# Patient Record
Sex: MALE | Race: WHITE | NOT HISPANIC OR LATINO | URBAN - METROPOLITAN AREA
[De-identification: names, ages, dates, MRNs, and addresses within clinical notes are randomized per-mention and may not be internally consistent; named-entity substitution may affect disease eponyms.]

---

## 2018-04-08 PROBLEM — K64.4 EXTERNAL HEMORRHOIDS: Status: ACTIVE | Noted: 2018-04-08

## 2018-04-09 ENCOUNTER — OFFICE VISIT (OUTPATIENT)
Dept: FAMILY MEDICINE | Facility: CLINIC | Age: 23
End: 2018-04-09
Payer: COMMERCIAL

## 2018-04-09 VITALS
SYSTOLIC BLOOD PRESSURE: 116 MMHG | WEIGHT: 145.7 LBS | BODY MASS INDEX: 22.87 KG/M2 | DIASTOLIC BLOOD PRESSURE: 70 MMHG | OXYGEN SATURATION: 99 % | HEART RATE: 70 BPM | HEIGHT: 67 IN | TEMPERATURE: 98.2 F

## 2018-04-09 DIAGNOSIS — Z00.01 ENCOUNTER FOR ROUTINE ADULT HEALTH EXAMINATION WITH ABNORMAL FINDINGS: Primary | ICD-10-CM

## 2018-04-09 DIAGNOSIS — Z11.3 SCREEN FOR STD (SEXUALLY TRANSMITTED DISEASE): ICD-10-CM

## 2018-04-09 DIAGNOSIS — L72.0 EPITHELIAL CYST: ICD-10-CM

## 2018-04-09 DIAGNOSIS — K64.4 EXTERNAL HEMORRHOIDS: ICD-10-CM

## 2018-04-09 LAB — HIV 1+2 AB+HIV1 P24 AG SERPL QL IA: NONREACTIVE

## 2018-04-09 PROCEDURE — 87491 CHLMYD TRACH DNA AMP PROBE: CPT | Performed by: FAMILY MEDICINE

## 2018-04-09 PROCEDURE — 87591 N.GONORRHOEAE DNA AMP PROB: CPT | Performed by: FAMILY MEDICINE

## 2018-04-09 PROCEDURE — 86780 TREPONEMA PALLIDUM: CPT | Performed by: FAMILY MEDICINE

## 2018-04-09 PROCEDURE — 87389 HIV-1 AG W/HIV-1&-2 AB AG IA: CPT | Performed by: FAMILY MEDICINE

## 2018-04-09 PROCEDURE — 36415 COLL VENOUS BLD VENIPUNCTURE: CPT | Performed by: FAMILY MEDICINE

## 2018-04-09 PROCEDURE — 99395 PREV VISIT EST AGE 18-39: CPT | Performed by: FAMILY MEDICINE

## 2018-04-09 PROCEDURE — 99212 OFFICE O/P EST SF 10 MIN: CPT | Mod: 25 | Performed by: FAMILY MEDICINE

## 2018-04-09 NOTE — MR AVS SNAPSHOT
After Visit Summary   4/9/2018    Ar Li    MRN: [id number]           Patient Information     Date Of Birth          1995        Visit Information        Provider Department      4/9/2018 10:00 AM Carlos Dai MD [facility] [place]        Today's Diagnoses     Encounter for routine adult health examination with abnormal findings    -  1    External hemorrhoids        Screen for STD (sexually transmitted disease)        Epithelial cyst          Care Instructions    -Try using unprocessed bran, citrucel or metamucil as needed to speed up your bowel movements to avoid irritating your hemorrhoids. It would also be a good idea to sit in water and wash the hemorrhoid with a clean cloth, and do not use soap.  -Over the counter diphenhydramine cream can be used for immediate relief as needed.              Hemorrhoids   What are hemorrhoids?   Hemorrhoids are swollen veins and tissue in the lower rectum and anus. The anus is at the end of the rectum and is the opening through which bowel movements pass from your body. Hemorrhoids are a common problem. Another name for them is piles.   Hemorrhoids may be internal (inside the rectum) or external (around the anus). Internal hemorrhoids are often painless but they sometimes cause a lot of bleeding. The internal veins may stretch and even fall out (prolapse) through the anus to outside the body. The veins may then become irritated and painful. External hemorrhoids can be seen or felt easily around the anal opening. When the swollen veins are scratched or broken by straining, rubbing, or wiping, they sometimes bleed.   How do they occur?   Veins in the rectum and around the anus tend to swell under pressure. Hemorrhoids can result from too much pressure on these veins. You may put pressure on these veins by:     straining to have a bowel movement when you are constipated     waiting too long to have a bowel movement     sitting for  a long time on the toilet, which causes strain on the anal area     coughing and sneezing often     sitting for a long while.   Hemorrhoids may also develop from:     diarrhea     obesity     injury to the anus, for example, from anal intercourse     some liver diseases.   Flare-ups of hemorrhoids may occur during periods of stress. Some people inherit a tendency to have hemorrhoids.   Pregnant women should try to avoid becoming constipated because they are more likely to have hemorrhoids during pregnancy. In the last trimester of pregnancy, the enlarged uterus may press on blood vessels and cause hemorrhoids. Also, the strain of childbirth sometimes causes hemorrhoids after the birth.   What are the symptoms?   Symptoms of hemorrhoids include:     itching, mild burning, and bleeding around the anus (for example, you might see bright red blood on toilet paper after wiping)     swelling and tenderness around the anus     pain with bowel movements     painful lumps around the anus ranging in size from a pea to a walnut (in severe cases).   How are they diagnosed?   Your healthcare provider will examine your rectum and anus. Your provider may use a special small light called a proctoscope or anoscope to look inside the rectum.   How is it treated?   The following treatments usually help to relieve most cases of hemorrhoids:     High-fiber diet   Eat more high-fiber foods, which will help prevent constipation. The best sources of fiber are whole-grain cereals, such as shredded wheat or cereals with bran. Fresh fruit and raw or cooked vegetables, especially asparagus, cabbage, carrots, corn, and broccoli are other good sources of fiber.     Fluids   Drink plenty of water. This helps to soften bowel movements so they are easier to pass.     Sitz baths and cold packs   Sitting in lukewarm water 2 or 3 times a day for 15 minutes cleans the anal area and may relieve discomfort. (If the bath water is too hot, swelling around  the anus will get worse.) Also, you might try putting a cloth-covered ice pack on the anus for 10 minutes, 4 times a day.     Medications   For mild discomfort, your healthcare provider may prescribe a cream or ointment for the painful area. The cream may contain witch hazel, zinc oxide, or petroleum jelly. Your provider may also prescribe medicated suppositories to put inside the rectum.     Procedures and surgeries   A number of procedures can be used to remove or shrink hemorrhoids. If you have painful, protruding internal hemorrhoids, your healthcare provider can do a procedure called hemorrhoid banding. Your provider will put a tight band around the enlarged vein and either cut the hemorrhoid open, remove any blood clots, and let the vein heal, or let the hemorrhoid dry up and fall off. This method is effective in most cases. Other methods include destroying the hemorrhoid with freezing, electrical or laser heat, or infrared light. Or your provider may shrink the hemorrhoid by injecting a chemical around the swollen vein.   For severe cases of hemorrhoids, a surgical procedure called a hemorrhoidectomy may be done. For this procedure you are first given an anesthetic to prevent you from feeling pain. Then your surgeon removes the hemorrhoids.   How long will the effects last?   Usually hemorrhoids do not pose a danger to your health. In most cases the symptoms go away in a few days. The painful lumps of more severe cases should get better in a couple of weeks.   How can I take care of myself?   Always tell your healthcare provider when you have rectal bleeding. Although bleeding may be from hemorrhoids, more serious illnesses, such as colon cancer, can also cause bleeding.   Follow these guidelines to help prevent hemorrhoids and to relieve their discomfort:     Do not strain during bowel movements. The straining makes hemorrhoids swell.     Follow your high-fiber diet and drink plenty of water. If necessary,  take a stool softener, such as Ivelisse's M-O, psyllium, Metamucil or Citrucel, or mineral oil. Softer stools make it easier to empty the bowels and reduce pressure on the veins.     Don't overuse laxatives. Diarrhea can be as irritating to the anus as constipation.     Ask your healthcare provider what nonprescription product you should buy to relieve pain and itching. Also, ask about any side effects of any medications prescribed for you.     Exercise regularly to help prevent constipation.     Avoid a lot of wiping after a bowel movement if you have hemorrhoids. Wiping with soft, moist toilet paper (or a commercial moist pad or baby wipe) may relieve discomfort. If necessary, shower instead of wiping, then dry the anus gently.              Preventive Health Recommendations  Male Ages 18 - 25     Yearly exam:             See your health care provider every year in order to  o   Review health changes.   o   Discuss preventive care.    o   Review your medicines if your doctor has prescribed any.    You should be tested each year for STDs (sexually transmitted diseases).     Talk to your provider about cholesterol testing.      If you are at risk for diabetes, you should have a diabetes test (fasting glucose).    Shots: Get a flu shot each year. Get a tetanus shot every 10 years.     Nutrition:    Eat at least 5 servings of fruits and vegetables daily.     Eat whole-grain bread, whole-wheat pasta and brown rice instead of white grains and rice.     Talk to your provider about calcium and Vitamin D.     Lifestyle    Exercise for at least 150 minutes a week (30 minutes a day, 5 days a week). This will help you control your weight and prevent disease.     Limit alcohol to one drink per day.     No smoking.     Wear sunscreen to prevent skin cancer.     See your dentist every six months for an exam and cleaning.             Follow-ups after your visit        Who to contact     If you have questions or need follow up  "information about today's clinic visit or your schedule please contact [facility] directly at [phone].  Normal or non-critical lab and imaging results will be communicated to you by MyChart, letter or phone within 4 business days after the clinic has received the results. If you do not hear from us within 7 days, please contact the clinic through [facility] or phone. If you have a critical or abnormal lab result, we will notify you by phone as soon as possible.  Submit refill requests through [employer] or call your pharmacy and they will forward the refill request to us. Please allow 3 business days for your refill to be completed.          Additional Information About Your Visit        MyChart Information     [employer] gives you secure access to your electronic health record. If you see a primary care provider, you can also send messages to your care team and make appointments. If you have questions, please call your primary care clinic.  If you do not have a primary care provider, please call [phone] and they will assist you.        Care EveryWhere ID     This is your Care EveryWhere ID. This could be used by other organizations to access your [facility] medical records  [id number]        Your Vitals Were     Pulse Temperature Height Pulse Oximetry BMI (Body Mass Index)       70 98.2  F (36.8  C) (Oral) 5' 6.93\" (1.7 m) 99% 22.87 kg/m2        Blood Pressure from Last 3 Encounters:   04/09/18 116/70   05/14/14 116/70   11/27/13 120/76    Weight from Last 3 Encounters:   04/09/18 145 lb 11.2 oz (66.1 kg)   05/14/14 131 lb (59.4 kg) (12 %)*   11/27/13 136 lb 9.6 oz (62 kg) (21 %)*     * Growth percentiles are based on CDC 2-20 Years data.              We Performed the Following     Anti Treponema     CHLAMYDIA TRACHOMATIS PCR     HIV Antigen Antibody Combo     NEISSERIA GONORRHOEA PCR     OFFICE/OUTPT VISIT,[facility]        Primary Care Provider Office Phone # Fax #    Carlos Dai MD " [phone]       [address]  [facility] [id number]        Equal Access to Services     GILLIAN BELCHER : Hadjose aad ku hadmariela Davidson, addieda minjohn, antonta kakatieda emanuel, tova bushemanuel chua. So [facility] [phone].    ATENCIÓN: Si habla español, tiene a perdomo disposición servicios gratuitos de asistencia lingüística. Llame al [phone].    We comply with applicable federal civil rights laws and [place] laws. We do not discriminate on the basis of race, color, national origin, age, disability, sex, sexual orientation, or gender identity.            Thank you!     Thank you for choosing [facility]  for your care. Our goal is always to provide you with excellent care. Hearing back from our patients is one way we can continue to improve our services. Please take a few minutes to complete the written survey that you may receive in the mail after your visit with us. Thank you!             Your Updated Medication List - Protect others around you: Learn how to safely use, store and throw away your medicines at www.disposemymeds.org.          This list is accurate as of 4/9/18 12:33 PM.  Always use your most recent med list.                   Brand Name Dispense Instructions for use Diagnosis    adapalene 0.1 % cream    DIFFERIN    45 g    Apply to dry skin nightly.    Acne       benzoyl peroxide 5 % topical gel     6 g    Apply  topically daily.    Acne       minocycline 100 MG capsule    MINOCIN/DYNACIN    60 capsule    Take 1 capsule (100 mg) by mouth 2 times daily    Acne

## 2018-04-09 NOTE — NURSING NOTE
"Chief Complaint   Patient presents with     Physical       Initial /70  Pulse 70  Temp 98.2  F (36.8  C) (Oral)  Ht 5' 6.93\" (1.7 m)  Wt 145 lb 11.2 oz (66.1 kg)  SpO2 99%  BMI 22.87 kg/m2 Estimated body mass index is 22.87 kg/(m^2) as calculated from the following:    Height as of this encounter: 5' 6.93\" (1.7 m).    Weight as of this encounter: 145 lb 11.2 oz (66.1 kg).  Medication Reconciliation: complete       Giancarlo Zuleta MA      "

## 2018-04-09 NOTE — PATIENT INSTRUCTIONS
-Try using unprocessed bran, citrucel or metamucil as needed to speed up your bowel movements to avoid irritating your hemorrhoids. It would also be a good idea to sit in water and wash the hemorrhoid with a clean cloth, and do not use soap.  -Over the counter diphenhydramine cream can be used for immediate relief as needed.              Hemorrhoids   What are hemorrhoids?   Hemorrhoids are swollen veins and tissue in the lower rectum and anus. The anus is at the end of the rectum and is the opening through which bowel movements pass from your body. Hemorrhoids are a common problem. Another name for them is piles.   Hemorrhoids may be internal (inside the rectum) or external (around the anus). Internal hemorrhoids are often painless but they sometimes cause a lot of bleeding. The internal veins may stretch and even fall out (prolapse) through the anus to outside the body. The veins may then become irritated and painful. External hemorrhoids can be seen or felt easily around the anal opening. When the swollen veins are scratched or broken by straining, rubbing, or wiping, they sometimes bleed.   How do they occur?   Veins in the rectum and around the anus tend to swell under pressure. Hemorrhoids can result from too much pressure on these veins. You may put pressure on these veins by:     straining to have a bowel movement when you are constipated     waiting too long to have a bowel movement     sitting for a long time on the toilet, which causes strain on the anal area     coughing and sneezing often     sitting for a long while.   Hemorrhoids may also develop from:     diarrhea     obesity     injury to the anus, for example, from anal intercourse     some liver diseases.   Flare-ups of hemorrhoids may occur during periods of stress. Some people inherit a tendency to have hemorrhoids.   Pregnant women should try to avoid becoming constipated because they are more likely to have hemorrhoids during pregnancy. In the  last trimester of pregnancy, the enlarged uterus may press on blood vessels and cause hemorrhoids. Also, the strain of childbirth sometimes causes hemorrhoids after the birth.   What are the symptoms?   Symptoms of hemorrhoids include:     itching, mild burning, and bleeding around the anus (for example, you might see bright red blood on toilet paper after wiping)     swelling and tenderness around the anus     pain with bowel movements     painful lumps around the anus ranging in size from a pea to a walnut (in severe cases).   How are they diagnosed?   Your healthcare provider will examine your rectum and anus. Your provider may use a special small light called a proctoscope or anoscope to look inside the rectum.   How is it treated?   The following treatments usually help to relieve most cases of hemorrhoids:     High-fiber diet   Eat more high-fiber foods, which will help prevent constipation. The best sources of fiber are whole-grain cereals, such as shredded wheat or cereals with bran. Fresh fruit and raw or cooked vegetables, especially asparagus, cabbage, carrots, corn, and broccoli are other good sources of fiber.     Fluids   Drink plenty of water. This helps to soften bowel movements so they are easier to pass.     Sitz baths and cold packs   Sitting in lukewarm water 2 or 3 times a day for 15 minutes cleans the anal area and may relieve discomfort. (If the bath water is too hot, swelling around the anus will get worse.) Also, you might try putting a cloth-covered ice pack on the anus for 10 minutes, 4 times a day.     Medications   For mild discomfort, your healthcare provider may prescribe a cream or ointment for the painful area. The cream may contain witch hazel, zinc oxide, or petroleum jelly. Your provider may also prescribe medicated suppositories to put inside the rectum.     Procedures and surgeries   A number of procedures can be used to remove or shrink hemorrhoids. If you have painful,  protruding internal hemorrhoids, your healthcare provider can do a procedure called hemorrhoid banding. Your provider will put a tight band around the enlarged vein and either cut the hemorrhoid open, remove any blood clots, and let the vein heal, or let the hemorrhoid dry up and fall off. This method is effective in most cases. Other methods include destroying the hemorrhoid with freezing, electrical or laser heat, or infrared light. Or your provider may shrink the hemorrhoid by injecting a chemical around the swollen vein.   For severe cases of hemorrhoids, a surgical procedure called a hemorrhoidectomy may be done. For this procedure you are first given an anesthetic to prevent you from feeling pain. Then your surgeon removes the hemorrhoids.   How long will the effects last?   Usually hemorrhoids do not pose a danger to your health. In most cases the symptoms go away in a few days. The painful lumps of more severe cases should get better in a couple of weeks.   How can I take care of myself?   Always tell your healthcare provider when you have rectal bleeding. Although bleeding may be from hemorrhoids, more serious illnesses, such as colon cancer, can also cause bleeding.   Follow these guidelines to help prevent hemorrhoids and to relieve their discomfort:     Do not strain during bowel movements. The straining makes hemorrhoids swell.     Follow your high-fiber diet and drink plenty of water. If necessary, take a stool softener, such as Ivelisse's M-O, psyllium, Metamucil or Citrucel, or mineral oil. Softer stools make it easier to empty the bowels and reduce pressure on the veins.     Don't overuse laxatives. Diarrhea can be as irritating to the anus as constipation.     Ask your healthcare provider what nonprescription product you should buy to relieve pain and itching. Also, ask about any side effects of any medications prescribed for you.     Exercise regularly to help prevent constipation.     Avoid a lot of  wiping after a bowel movement if you have hemorrhoids. Wiping with soft, moist toilet paper (or a commercial moist pad or baby wipe) may relieve discomfort. If necessary, shower instead of wiping, then dry the anus gently.              Preventive Health Recommendations  Male Ages 18 - 25     Yearly exam:             See your health care provider every year in order to  o   Review health changes.   o   Discuss preventive care.    o   Review your medicines if your doctor has prescribed any.    You should be tested each year for STDs (sexually transmitted diseases).     Talk to your provider about cholesterol testing.      If you are at risk for diabetes, you should have a diabetes test (fasting glucose).    Shots: Get a flu shot each year. Get a tetanus shot every 10 years.     Nutrition:    Eat at least 5 servings of fruits and vegetables daily.     Eat whole-grain bread, whole-wheat pasta and brown rice instead of white grains and rice.     Talk to your provider about calcium and Vitamin D.     Lifestyle    Exercise for at least 150 minutes a week (30 minutes a day, 5 days a week). This will help you control your weight and prevent disease.     Limit alcohol to one drink per day.     No smoking.     Wear sunscreen to prevent skin cancer.     See your dentist every six months for an exam and cleaning.

## 2018-04-09 NOTE — PROGRESS NOTES
SUBJECTIVE:   CC: Ar Li is an 22 year old male who presents for preventative health visit.     Patient has been having problems on and off for the last few years with hemorrhoids. He says that his hemorrhoids were not painful or itchy until a few months ago, when it became inflamed and painful. His bowel movements are regular and have a normal consistency. He has used preparation H and neosporin to relief, as well as using baths or showers to wash the area. Patient's job requires spending a lot of time outside and in remote areas, and he says that the hemorrhoids are at their worst when he hasn't been having regular showers while in remote areas.    Patient requests having labs for STD screening today. No symptoms of STD's, and he does not expect a positive test.    Answers for HPI/ROS submitted by the patient on 4/9/2018   Annual Exam:  Getting at least 3 servings of Calcium per day:: Yes  Bi-annual eye exam:: Yes  Dental care twice a year:: Yes  Sleep apnea or symptoms of sleep apnea:: None  Diet:: Regular (no restrictions)  Frequency of exercise:: 4-5 days/week  Taking medications regularly:: Yes  Medication side effects:: Not applicable, None  Additional concerns today:: No  PHQ-2 Score: 0  Duration of exercise:: Greater than 60 minutes    Today's PHQ-2 Score:   PHQ-2 ( 1999 [employer]) 4/9/2018 4/9/2018   Q1: Little interest or pleasure in doing things 0 0   Q2: Feeling down, depressed or hopeless 0 0   PHQ-2 Score 0 0   Q1: Little interest or pleasure in doing things Not at all -   Q2: Feeling down, depressed or hopeless Not at all -   PHQ-2 Score 0 -       Abuse: Current or Past(Physical, Sexual or Emotional)- No  Do you feel safe in your environment - Yes    Social History   Substance Use Topics     Smoking status: Never Smoker     Smokeless tobacco: Never Used     Alcohol use Yes      Comment: 1/week      If you drink alcohol do you typically have >3 drinks per day or >7 drinks per week? No                       Last PSA: No results found for: PSA    Reviewed orders with patient. Reviewed health maintenance and updated orders accordingly - Yes  Patient Active Problem List   Diagnosis     Other acne     Acne     External hemorrhoids     Past Surgical History:   Procedure Laterality Date     NO HISTORY OF SURGERY         Social History   Substance Use Topics     Smoking status: Never Smoker     Smokeless tobacco: Never Used     Alcohol use Yes      Comment: 1/week     Family History   Problem Relation Age of Onset     Cardiovascular Maternal Grandmother 35     sudden cardiac death     DIABETES Paternal Grandfather      CEREBROVASCULAR DISEASE Paternal Grandfather      Prostate Cancer Paternal Grandfather      Arthritis Paternal Grandmother      Arthritis Maternal Grandfather      Depression Maternal Grandfather      Eye Disorder Maternal Grandfather      Family History Negative Mother      Family History Negative Father          Current Outpatient Prescriptions   Medication Sig Dispense Refill     minocycline (MINOCIN,DYNACIN) 100 MG capsule Take 1 capsule (100 mg) by mouth 2 times daily (Patient not taking: Reported on 4/9/2018) 60 capsule 11     adapalene (DIFFERIN) 0.1 % cream Apply to dry skin nightly. (Patient not taking: Reported on 4/9/2018) 45 g 11     benzoyl peroxide 5 % gel Apply  topically daily. (Patient not taking: Reported on 4/9/2018) 6 g 3     Allergies   Allergen Reactions     Cats        Reviewed and updated as needed this visit by clinical staff  Tobacco  Allergies  Meds  Med Hx  Surg Hx  Fam Hx  Soc Hx        Reviewed and updated as needed this visit by Provider            ROS:  Positive for hemorrhoids.    Denies headache, insomnia, chest pain, shortness of breath, cough, heartburn, bowel issues, bladder issues, neck pain, back pain, hip pain, knee pain, ankle pain, or foot pain. Remainder of ROS is negative unless otherwise noted above or in HPI.    This document serves as a record  "of the services and decisions personally performed and made by Carlos Dai MD. It was created on his behalf by Pedro Rendon, a trained medical scribe. The creation of this document is based on the provider's statements to the medical scribe.  Pedro Rendon 10:04 AM April 9, 2018    OBJECTIVE:   /70  Pulse 70  Temp 98.2  F (36.8  C) (Oral)  Ht 1.7 m (5' 6.93\")  Wt 66.1 kg (145 lb 11.2 oz)  SpO2 99%  BMI 22.87 kg/m2  EXAM:  GENERAL: healthy, alert and no distress  EYES: Eyes grossly normal to inspection, PERRL and conjunctivae and sclerae normal. EOMI.  HENT: ear canals and TM's normal, nose and mouth without ulcers or lesions  NECK: no adenopathy, no asymmetry, masses, or scars and thyroid normal to palpation. TMJ normal.  RESP: lungs clear to auscultation - no rales, rhonchi or wheezes  CV: regular rate and rhythm, normal S1 S2, no S3 or S4, no murmur, click or rub, no peripheral edema and peripheral pulses strong  ABDOMEN: soft, nontender, no hepatosplenomegaly, no masses and bowel sounds normal   (male): normal male genitalia without lesions or urethral discharge, no hernia  RECTAL: fingertip sized hemorrhoid purple and swelling at 5 o'clock with the patient bent forward on exam table, no internal hemorrhoids.  MS: no gross musculoskeletal defects noted, no edema. Calves nontender.  SKIN: 2-3 mm papule in the anterior portion of right axilla without tenderness, redness or discharge  NEURO: Normal strength and tone, mentation intact and speech normal. Knee reflexes normal. No tremors.  PSYCH: mentation appears normal, affect normal/bright  LYMPH: no cervical, supraclavicular, axillary, or inguinal adenopathy    ASSESSMENT/PLAN:   (Z00.01) Encounter for routine adult health examination with abnormal findings  (primary encounter diagnosis)  Comment: Routine physical and labs completed today.  Plan: Follow up with results from lab.    (K64.4) External hemorrhoids  Comment: As above in " physical exam.  Plan: As below in patient instructions.    (Z11.3) Screen for STD (sexually transmitted disease)  Comment: Labs completed today.  Plan: NEISSERIA GONORRHOEA PCR, CHLAMYDIA TRACHOMATIS        PCR, HIV Antigen Antibody Combo, Anti Treponema        Follow up with results from lab.    (L72.0) Epithelial cyst  Comment: As above in physical exam.  Plan: Will continue to monitor. Follow up as needed.    Patient Instructions   -Try using unprocessed bran, citrucel or metamucil as needed to speed up your bowel movements to avoid irritating your hemorrhoids. It would also be a good idea to sit in water and wash the hemorrhoid with a clean cloth, and do not use soap.  -Over the counter diphenhydramine cream can be used for immediate relief as needed.              Hemorrhoids   What are hemorrhoids?   Hemorrhoids are swollen veins and tissue in the lower rectum and anus. The anus is at the end of the rectum and is the opening through which bowel movements pass from your body. Hemorrhoids are a common problem. Another name for them is piles.   Hemorrhoids may be internal (inside the rectum) or external (around the anus). Internal hemorrhoids are often painless but they sometimes cause a lot of bleeding. The internal veins may stretch and even fall out (prolapse) through the anus to outside the body. The veins may then become irritated and painful. External hemorrhoids can be seen or felt easily around the anal opening. When the swollen veins are scratched or broken by straining, rubbing, or wiping, they sometimes bleed.   How do they occur?   Veins in the rectum and around the anus tend to swell under pressure. Hemorrhoids can result from too much pressure on these veins. You may put pressure on these veins by:     straining to have a bowel movement when you are constipated     waiting too long to have a bowel movement     sitting for a long time on the toilet, which causes strain on the anal area     coughing  and sneezing often     sitting for a long while.   Hemorrhoids may also develop from:     diarrhea     obesity     injury to the anus, for example, from anal intercourse     some liver diseases.   Flare-ups of hemorrhoids may occur during periods of stress. Some people inherit a tendency to have hemorrhoids.   Pregnant women should try to avoid becoming constipated because they are more likely to have hemorrhoids during pregnancy. In the last trimester of pregnancy, the enlarged uterus may press on blood vessels and cause hemorrhoids. Also, the strain of childbirth sometimes causes hemorrhoids after the birth.   What are the symptoms?   Symptoms of hemorrhoids include:     itching, mild burning, and bleeding around the anus (for example, you might see bright red blood on toilet paper after wiping)     swelling and tenderness around the anus     pain with bowel movements     painful lumps around the anus ranging in size from a pea to a walnut (in severe cases).   How are they diagnosed?   Your healthcare provider will examine your rectum and anus. Your provider may use a special small light called a proctoscope or anoscope to look inside the rectum.   How is it treated?   The following treatments usually help to relieve most cases of hemorrhoids:     High-fiber diet   Eat more high-fiber foods, which will help prevent constipation. The best sources of fiber are whole-grain cereals, such as shredded wheat or cereals with bran. Fresh fruit and raw or cooked vegetables, especially asparagus, cabbage, carrots, corn, and broccoli are other good sources of fiber.     Fluids   Drink plenty of water. This helps to soften bowel movements so they are easier to pass.     Sitz baths and cold packs   Sitting in lukewarm water 2 or 3 times a day for 15 minutes cleans the anal area and may relieve discomfort. (If the bath water is too hot, swelling around the anus will get worse.) Also, you might try putting a cloth-covered ice  pack on the anus for 10 minutes, 4 times a day.     Medications   For mild discomfort, your healthcare provider may prescribe a cream or ointment for the painful area. The cream may contain witch hazel, zinc oxide, or petroleum jelly. Your provider may also prescribe medicated suppositories to put inside the rectum.     Procedures and surgeries   A number of procedures can be used to remove or shrink hemorrhoids. If you have painful, protruding internal hemorrhoids, your healthcare provider can do a procedure called hemorrhoid banding. Your provider will put a tight band around the enlarged vein and either cut the hemorrhoid open, remove any blood clots, and let the vein heal, or let the hemorrhoid dry up and fall off. This method is effective in most cases. Other methods include destroying the hemorrhoid with freezing, electrical or laser heat, or infrared light. Or your provider may shrink the hemorrhoid by injecting a chemical around the swollen vein.   For severe cases of hemorrhoids, a surgical procedure called a hemorrhoidectomy may be done. For this procedure you are first given an anesthetic to prevent you from feeling pain. Then your surgeon removes the hemorrhoids.   How long will the effects last?   Usually hemorrhoids do not pose a danger to your health. In most cases the symptoms go away in a few days. The painful lumps of more severe cases should get better in a couple of weeks.   How can I take care of myself?   Always tell your healthcare provider when you have rectal bleeding. Although bleeding may be from hemorrhoids, more serious illnesses, such as colon cancer, can also cause bleeding.   Follow these guidelines to help prevent hemorrhoids and to relieve their discomfort:     Do not strain during bowel movements. The straining makes hemorrhoids swell.     Follow your high-fiber diet and drink plenty of water. If necessary, take a stool softener, such as Ivelisse's M-O, psyllium, Metamucil or  Citrucel, or mineral oil. Softer stools make it easier to empty the bowels and reduce pressure on the veins.     Don't overuse laxatives. Diarrhea can be as irritating to the anus as constipation.     Ask your healthcare provider what nonprescription product you should buy to relieve pain and itching. Also, ask about any side effects of any medications prescribed for you.     Exercise regularly to help prevent constipation.     Avoid a lot of wiping after a bowel movement if you have hemorrhoids. Wiping with soft, moist toilet paper (or a commercial moist pad or baby wipe) may relieve discomfort. If necessary, shower instead of wiping, then dry the anus gently.              Preventive Health Recommendations  Male Ages 18 - 25     Yearly exam:             See your health care provider every year in order to  o   Review health changes.   o   Discuss preventive care.    o   Review your medicines if your doctor has prescribed any.    You should be tested each year for STDs (sexually transmitted diseases).     Talk to your provider about cholesterol testing.      If you are at risk for diabetes, you should have a diabetes test (fasting glucose).    Shots: Get a flu shot each year. Get a tetanus shot every 10 years.     Nutrition:    Eat at least 5 servings of fruits and vegetables daily.     Eat whole-grain bread, whole-wheat pasta and brown rice instead of white grains and rice.     Talk to your provider about calcium and Vitamin D.     Lifestyle    Exercise for at least 150 minutes a week (30 minutes a day, 5 days a week). This will help you control your weight and prevent disease.     Limit alcohol to one drink per day.     No smoking.     Wear sunscreen to prevent skin cancer.     See your dentist every six months for an exam and cleaning.           COUNSELING:  Reviewed preventive health counseling, as reflected in patient instructions     reports that he has never smoked. He has never used smokeless  "tobacco.  Estimated body mass index is 22.87 kg/(m^2) as calculated from the following:    Height as of this encounter: 1.7 m (5' 6.93\").    Weight as of this encounter: 66.1 kg (145 lb 11.2 oz).       The information in this document, created by the medical scribe for me, accurately reflects the services I personally performed and the decisions made by me. I have reviewed and approved this document for accuracy prior to leaving the patient care area.   Carlos Dai MD 10:03 AM April 9, 2018  [facility] [place]  "

## 2018-04-10 LAB
C TRACH DNA SPEC QL NAA+PROBE: NEGATIVE
N GONORRHOEA DNA SPEC QL NAA+PROBE: NEGATIVE
SPECIMEN SOURCE: NORMAL
SPECIMEN SOURCE: NORMAL
T PALLIDUM IGG+IGM SER QL: NEGATIVE

## 2018-04-13 NOTE — PROGRESS NOTES
Real Joyner, your recent results are back and are all normal. Please contact if any questions.   Carlos

## 2019-04-08 ENCOUNTER — OFFICE VISIT (OUTPATIENT)
Dept: FAMILY MEDICINE | Facility: CLINIC | Age: 24
End: 2019-04-08
Payer: COMMERCIAL

## 2019-04-08 DIAGNOSIS — L28.0 NEURODERMATITIS, LOCALIZED: Primary | ICD-10-CM

## 2019-04-08 DIAGNOSIS — Q66.70 PES CAVUS: ICD-10-CM

## 2019-04-08 PROCEDURE — 99214 OFFICE O/P EST MOD 30 MIN: CPT | Performed by: FAMILY MEDICINE

## 2019-04-08 RX ORDER — FLUOCINONIDE 0.5 MG/G
CREAM TOPICAL 2 TIMES DAILY
Qty: 30 G | Refills: 1 | Status: SHIPPED | OUTPATIENT
Start: 2019-04-08 | End: 2020-03-24

## 2019-04-08 NOTE — PROGRESS NOTES
SUBJECTIVE:                                                    Ar Li is a 23 year old male who presents to clinic today for the following health issues:    Rash  Onset: 1 year    Description:   Location: tops of both feet  Character: Scaly, pink  Itching (Pruritis): YES    Progression of Symptoms:  worsening    Accompanying Signs & Symptoms:  Fever: no   Body aches or joint pain: no   Sore throat symptoms: no   Recent cold symptoms: no     History:   Previous similar rash: YES- athletes foot    Precipitating factors:   Exposure to similar rash: no   New exposures: None   Recent travel: YES- was in [place] the past week     Alleviating factors:  Therapies Tried and outcome: OTC anti fungal. For 3 weeks tried 3 different things for it. It improved but mainly due to moisturizing    Patient first noticed the rash approximately 15 months ago. The rash will alleviate at times, but will then return. He notes that the rash will typically occur when his feet are wet or damp, such as when he wears ski boots in the winter. Patient does not use fabric softener, but does use dryer sheets. He denies having a rash on other areas of the body.      Problem list and histories reviewed & adjusted, as indicated.  Additional history: as documented    Patient Active Problem List   Diagnosis     Other acne     Acne     External hemorrhoids     Epithelial cyst     Past Surgical History:   Procedure Laterality Date     NO HISTORY OF SURGERY         Social History     Tobacco Use     Smoking status: Never Smoker     Smokeless tobacco: Never Used   Substance Use Topics     Alcohol use: Yes     Comment: 1/week     Family History   Problem Relation Age of Onset     Cardiovascular Maternal Grandmother 35        sudden cardiac death     Diabetes Paternal Grandfather      Cerebrovascular Disease Paternal Grandfather      Prostate Cancer Paternal Grandfather      Arthritis Paternal Grandmother      Arthritis Maternal Grandfather       Depression Maternal Grandfather      Eye Disorder Maternal Grandfather      Family History Negative Mother      Family History Negative Father          Current Outpatient Medications   Medication Sig Dispense Refill     fluocinonide (LIDEX) 0.05 % external cream Apply topically 2 times daily 30 g 1     adapalene (DIFFERIN) 0.1 % cream Apply to dry skin nightly. (Patient not taking: Reported on 4/9/2018) 45 g 11     benzoyl peroxide 5 % gel Apply  topically daily. (Patient not taking: Reported on 4/9/2018) 6 g 3     minocycline (MINOCIN,DYNACIN) 100 MG capsule Take 1 capsule (100 mg) by mouth 2 times daily (Patient not taking: Reported on 4/9/2018) 60 capsule 11     Allergies   Allergen Reactions     Cats      BP Readings from Last 3 Encounters:   04/09/18 116/70   05/14/14 116/70   11/27/13 120/76    Wt Readings from Last 3 Encounters:   04/09/18 66.1 kg (145 lb 11.2 oz)   05/14/14 59.4 kg (131 lb) (12 %)*   11/27/13 62 kg (136 lb 9.6 oz) (21 %)*     * Growth percentiles are based on CDC (Boys, 2-20 Years) data.                  Labs reviewed in EPIC    ROS:  Remainder of ROS is negative unless otherwise noted above or in HPI.    This document serves as a record of the services and decisions personally performed and made by Carlos Dai MD. It was created on his behalf by Fran Faust, trained medical scribe. The creation of this document is based on the provider's statements to the medical scribe.  Fran Faust 10:52 AM April 8, 2019    OBJECTIVE:     There were no vitals taken for this visit.  There is no height or weight on file to calculate BMI.  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  Plan: fluocinonide (LIDEX) 0.05 % external cream        Begin using Lidex cream as instructed. Follow up as needed.    (Q66.7) Pes cavus  Comment: Referred to Podiatry.  Plan: PODIATRY/FOOT & ANKLE SURGERY REFERRAL        Follow up with referral.      Patient Instructions   Don't use the cream for more than two weeks at a time.      The information in this document, created by the medical scribe for me, accurately reflects the services I personally performed and the decisions made by me. I have reviewed and approved this document for accuracy prior to leaving the patient care area.  April 8, 2019 10:53 AM    Carlos Dai MD  [facility] [place]

## 2019-04-09 ENCOUNTER — OFFICE VISIT (OUTPATIENT)
Dept: PODIATRY | Facility: CLINIC | Age: 24
End: 2019-04-09
Payer: COMMERCIAL

## 2019-04-09 VITALS
HEART RATE: 80 BPM | SYSTOLIC BLOOD PRESSURE: 130 MMHG | WEIGHT: 145 LBS | BODY MASS INDEX: 22.76 KG/M2 | DIASTOLIC BLOOD PRESSURE: 78 MMHG

## 2019-04-09 DIAGNOSIS — M21.6X2 PRONATION OF BOTH FEET: ICD-10-CM

## 2019-04-09 DIAGNOSIS — L30.9 DERMATITIS: Primary | ICD-10-CM

## 2019-04-09 DIAGNOSIS — M79.671 FOOT PAIN, BILATERAL: ICD-10-CM

## 2019-04-09 DIAGNOSIS — M21.6X1 PRONATION OF BOTH FEET: ICD-10-CM

## 2019-04-09 DIAGNOSIS — M79.672 FOOT PAIN, BILATERAL: ICD-10-CM

## 2019-04-09 PROCEDURE — 99243 OFF/OP CNSLTJ NEW/EST LOW 30: CPT | Performed by: PODIATRIST

## 2019-04-09 NOTE — PATIENT INSTRUCTIONS
We wish you continued good healing. If you have any questions or concerns, please do not hesitate to contact us at [phone]    Please remember to call and schedule a follow up appointment if one was recommended at your earliest convenience.   PODIATRY CLINIC HOURS  TELEPHONE NUMBER    Dr. Martínez Eisenberg D.P.M [facility]    Clinics:  [facility]    [facility]   Tuesday 1PM-6PM  [place]  Wednesday 7AM-2PM  [facility]  Thursday 10AM-6PM  [place]  Friday 7AM-3PM  [place]  Specialty schedulers:   [phone] to make an appointment with any Specialty Provider.        Urgent Care locations:    [facility] Monday-Friday 5 pm - 9 pm. Saturday-Sunday 9 am -5pm    Monday-Friday 11 am - 9 pm Saturday 9 am - 5 pm     Monday-Sunday 12 noon-8PM [phone]     If you need a medication refill, please contact us you may need lab work and/or a follow up visit prior to your refill (i.e. Antifungal medications).    [employer] (secure e-mail communication and access to your chart) to send a message or to make an appointment.    If MRI needed please call [facility] at [phone]        Weight management plan: Patient was referred to their PCP to discuss a diet and exercise plan.

## 2019-04-09 NOTE — PROGRESS NOTES
S: Patient seen in consult from Dr. Dai.  He has a rash on both of his feet.  He points to the anterior medial ankle.  He has had this for approximately a year.  It started on the right foot.  He was given a prescription for steroid cream but has not placed this on yet.  He has tried antifungal cream for 3 weeks and this has not helped.  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  He had discomfort at the end of the day sometimes in his winter boots.  He got a super feet arch support and this helped to resolve this.  Otherwise his feet do not bother him at all.  Denies erythema edema numbness or weakness    ROS:  A 10-point review of systems was performed and is positive for that noted in the HPI and as seen above.  All other areas are negative.          Allergies   Allergen Reactions     Cats        Current Outpatient Medications   Medication Sig Dispense Refill     adapalene (DIFFERIN) 0.1 % cream Apply to dry skin nightly. 45 g 11     benzoyl peroxide 5 % gel Apply  topically daily. 6 g 3     fluocinonide (LIDEX) 0.05 % external cream Apply topically 2 times daily 30 g 1     minocycline (MINOCIN,DYNACIN) 100 MG capsule Take 1 capsule (100 mg) by mouth 2 times daily 60 capsule 11       Patient Active Problem List   Diagnosis     Other acne     Acne     External hemorrhoids     Epithelial cyst       Past Medical History:   Diagnosis Date     Other acne     Acne       Past Surgical History:   Procedure Laterality Date     NO HISTORY OF SURGERY         Family History   Problem Relation Age of Onset     Cardiovascular Maternal Grandmother 35        sudden cardiac death     Diabetes Paternal Grandfather      Cerebrovascular Disease Paternal Grandfather      Prostate Cancer Paternal Grandfather      Arthritis Paternal Grandmother      Arthritis Maternal Grandfather      Depression Maternal Grandfather      Eye Disorder Maternal  Grandfather      Family History Negative Mother      Family History Negative Father        Social History     Tobacco Use     Smoking status: Never Smoker     Smokeless tobacco: Never Used   Substance Use Topics     Alcohol use: Yes     Comment: 1/week         Exam:    Vitals: /78 (BP Location: Left arm)   Pulse 80   Wt 65.8 kg (145 lb)   BMI 22.76 kg/m    BMI: Body mass index is 22.76 kg/m .  Height: Data Unavailable    Constitutional/ general:  Pt is in no apparent distress, appears well-nourished.  Cooperative with history and physical exam.     Psych:  The patient answered questions appropriately.  Normal affect.  Seems to have reasonable expectations, in terms of treatment.     Eyes:  Visual scanning/ tracking without deficit.     Ears:  Response to auditory stimuli is normal.  negative hearing aid devices.  Auricles in proper alignment.     Lymphatic:  Popliteal lymph nodes not enlarged.     Lungs:  Non labored breathing, non labored speech. No cough.  No audible wheezing. Even, quiet breathing.       Vascular:  positive pedal pulses bilaterally for both the DP and PT arteries.  CFT < 3 sec.  negative ankle edema.  positive pedal hair growth.    Neuro:  Alert and oriented x 3. Coordinated gait.  Light touch sensation is intact to the L4, L5, S1 distributions. No obvious deficits.  No evidence of neurological-based weakness, spasticity, or contracture in the lower extremities.      Derm: Normal texture and turgor.  No erythema, ecchymosis, or cyanosis.    Papulosquamous rash size of a quarter on the anterior medial ankles bilaterally.  Is no surrounding erythema    Musculoskeletal:    Lower extremity muscle strength is normal.  Patient is ambulatory without an assistive device or brace.  No gross deformities.  Normal ROM all fore foot and rearfoot joints.  No equinus.  With weightbearing patient has bilateral pronation.  No pain with palpation or ROM.  No pain with stressing any muscle compartments.   Good calcaneal iversion with foot flexion.  no erythema edema or ecchymosis or masses noted.        A:    Dermatitis bilateral medial ankles   Bilateral pronation causing pain    P: Patient will use steroid cream as directed.  Patient will look at boots to ensure that there is no material irritating this area.  Discussed because of pronation.  He has a smooth range of motion with no pain.  We instructed on wearing stiff supportive shoes at all times and I made suggestions.  He will continue the super feet arch supports.  He will call me if it ever like orthotics.  RETURN TO CLINIC PRN.  Thank you for allowing me participate in the care of this patient.        Martínez Eisenberg DPM, FACFAS

## 2019-04-09 NOTE — LETTER
4/9/2019         RE: Ar Li  [address] [facility] [address] [id number]        Dear Colleague,    Thank you for referring your patient, Ar Li, to the [facility]. Please see a copy of my visit note below.    S: Patient seen in consult from Dr. Dai.  He has a rash on both of his feet.  He points to the anterior medial ankle.  He has had this for approximately a year.  It started on the right foot.  He was given a prescription for steroid cream but has not placed this on yet.  He has tried antifungal cream for 3 weeks and this has not helped.  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  Before that he worked in a [employer] at [employer] and was in winter boots.  He had discomfort at the end of the day sometimes in his winter boots.  He got a super feet arch support and this helped to resolve this.  Otherwise his feet do not bother him at all.  Denies erythema edema numbness or weakness    ROS:  A 10-point review of systems was performed and is positive for that noted in the HPI and as seen above.  All other areas are negative.          Allergies   Allergen Reactions     Cats        Current Outpatient Medications   Medication Sig Dispense Refill     adapalene (DIFFERIN) 0.1 % cream Apply to dry skin nightly. 45 g 11     benzoyl peroxide 5 % gel Apply  topically daily. 6 g 3     fluocinonide (LIDEX) 0.05 % external cream Apply topically 2 times daily 30 g 1     minocycline (MINOCIN,DYNACIN) 100 MG capsule Take 1 capsule (100 mg) by mouth 2 times daily 60 capsule 11       Patient Active Problem List   Diagnosis     Other acne     Acne     External hemorrhoids     Epithelial cyst       Past Medical History:   Diagnosis Date     Other acne     Acne       Past Surgical History:   Procedure Laterality Date     NO HISTORY OF SURGERY         Family History   Problem Relation Age of Onset     Cardiovascular Maternal Grandmother 35        sudden cardiac death      Diabetes Paternal Grandfather      Cerebrovascular Disease Paternal Grandfather      Prostate Cancer Paternal Grandfather      Arthritis Paternal Grandmother      Arthritis Maternal Grandfather      Depression Maternal Grandfather      Eye Disorder Maternal Grandfather      Family History Negative Mother      Family History Negative Father        Social History     Tobacco Use     Smoking status: Never Smoker     Smokeless tobacco: Never Used   Substance Use Topics     Alcohol use: Yes     Comment: 1/week         Exam:    Vitals: /78 (BP Location: Left arm)   Pulse 80   Wt 65.8 kg (145 lb)   BMI 22.76 kg/m     BMI: Body mass index is 22.76 kg/m .  Height: Data Unavailable    Constitutional/ general:  Pt is in no apparent distress, appears well-nourished.  Cooperative with history and physical exam.     Psych:  The patient answered questions appropriately.  Normal affect.  Seems to have reasonable expectations, in terms of treatment.     Eyes:  Visual scanning/ tracking without deficit.     Ears:  Response to auditory stimuli is normal.  negative hearing aid devices.  Auricles in proper alignment.     Lymphatic:  Popliteal lymph nodes not enlarged.     Lungs:  Non labored breathing, non labored speech. No cough.  No audible wheezing. Even, quiet breathing.       Vascular:  positive pedal pulses bilaterally for both the DP and PT arteries.  CFT < 3 sec.  negative ankle edema.  positive pedal hair growth.    Neuro:  Alert and oriented x 3. Coordinated gait.  Light touch sensation is intact to the L4, L5, S1 distributions. No obvious deficits.  No evidence of neurological-based weakness, spasticity, or contracture in the lower extremities.      Derm: Normal texture and turgor.  No erythema, ecchymosis, or cyanosis.    Papulosquamous rash size of a quarter on the anterior medial ankles bilaterally.  Is no surrounding erythema    Musculoskeletal:    Lower extremity muscle strength is normal.  Patient is  ambulatory without an assistive device or brace.  No gross deformities.  Normal ROM all fore foot and rearfoot joints.  No equinus.  With weightbearing patient has bilateral pronation.  No pain with palpation or ROM.  No pain with stressing any muscle compartments.  Good calcaneal iversion with foot flexion.  no erythema edema or ecchymosis or masses noted.        A:    Dermatitis bilateral medial ankles   Bilateral pronation causing pain    P: Patient will use steroid cream as directed.  Patient will look at boots to ensure that there is no material irritating this area.  Discussed because of pronation.  He has a smooth range of motion with no pain.  We instructed on wearing stiff supportive shoes at all times and I made suggestions.  He will continue the super feet arch supports.  He will call me if it ever like orthotics.  RETURN TO CLINIC PRN.  Thank you for allowing me participate in the care of this patient.        Martínez Eisenberg DPM, FACFAS           Again, thank you for allowing me to participate in the care of your patient.        Sincerely,        Martínez Eisenberg DPM

## 2019-05-07 ENCOUNTER — APPOINTMENT (OUTPATIENT)
Dept: OPTOMETRY | Facility: CLINIC | Age: 24
End: 2019-05-07
Payer: COMMERCIAL

## 2019-05-07 PROCEDURE — V2100 LENS SPHER SINGLE PLANO 4.00: HCPCS | Mod: LT | Performed by: OPTOMETRIST

## 2019-05-07 PROCEDURE — V2020 VISION SVCS FRAMES PURCHASES: HCPCS | Performed by: OPTOMETRIST

## 2019-10-05 ENCOUNTER — HEALTH MAINTENANCE LETTER (OUTPATIENT)
Age: 24
End: 2019-10-05

## 2020-03-24 ENCOUNTER — OFFICE VISIT (OUTPATIENT)
Dept: FAMILY MEDICINE | Facility: CLINIC | Age: 25
End: 2020-03-24
Payer: COMMERCIAL

## 2020-03-24 ENCOUNTER — NURSE TRIAGE (OUTPATIENT)
Dept: FAMILY MEDICINE | Facility: CLINIC | Age: 25
End: 2020-03-24

## 2020-03-24 ENCOUNTER — TELEPHONE (OUTPATIENT)
Dept: FAMILY MEDICINE | Facility: CLINIC | Age: 25
End: 2020-03-24

## 2020-03-24 VITALS
DIASTOLIC BLOOD PRESSURE: 68 MMHG | WEIGHT: 142.1 LBS | HEART RATE: 50 BPM | BODY MASS INDEX: 22.3 KG/M2 | TEMPERATURE: 98.5 F | OXYGEN SATURATION: 97 % | SYSTOLIC BLOOD PRESSURE: 132 MMHG

## 2020-03-24 DIAGNOSIS — N45.1 EPIDIDYMITIS: Primary | ICD-10-CM

## 2020-03-24 DIAGNOSIS — Z11.3 SCREEN FOR STD (SEXUALLY TRANSMITTED DISEASE): ICD-10-CM

## 2020-03-24 DIAGNOSIS — Z23 NEED FOR VACCINATION: ICD-10-CM

## 2020-03-24 DIAGNOSIS — N50.812 TESTICULAR PAIN, LEFT: ICD-10-CM

## 2020-03-24 PROCEDURE — 90651 9VHPV VACCINE 2/3 DOSE IM: CPT | Performed by: NURSE PRACTITIONER

## 2020-03-24 PROCEDURE — 90472 IMMUNIZATION ADMIN EACH ADD: CPT | Performed by: NURSE PRACTITIONER

## 2020-03-24 PROCEDURE — 90632 HEPA VACCINE ADULT IM: CPT | Performed by: NURSE PRACTITIONER

## 2020-03-24 PROCEDURE — 87591 N.GONORRHOEAE DNA AMP PROB: CPT | Performed by: NURSE PRACTITIONER

## 2020-03-24 PROCEDURE — 99213 OFFICE O/P EST LOW 20 MIN: CPT | Mod: 25 | Performed by: NURSE PRACTITIONER

## 2020-03-24 PROCEDURE — 86780 TREPONEMA PALLIDUM: CPT | Performed by: NURSE PRACTITIONER

## 2020-03-24 PROCEDURE — 90471 IMMUNIZATION ADMIN: CPT | Performed by: NURSE PRACTITIONER

## 2020-03-24 PROCEDURE — 87491 CHLMYD TRACH DNA AMP PROBE: CPT | Performed by: NURSE PRACTITIONER

## 2020-03-24 PROCEDURE — 87389 HIV-1 AG W/HIV-1&-2 AB AG IA: CPT | Performed by: NURSE PRACTITIONER

## 2020-03-24 PROCEDURE — 36415 COLL VENOUS BLD VENIPUNCTURE: CPT | Performed by: NURSE PRACTITIONER

## 2020-03-24 PROCEDURE — 90715 TDAP VACCINE 7 YRS/> IM: CPT | Performed by: NURSE PRACTITIONER

## 2020-03-24 RX ORDER — LEVOFLOXACIN 500 MG/1
500 TABLET, FILM COATED ORAL DAILY
Qty: 10 TABLET | Refills: 0 | Status: SHIPPED | OUTPATIENT
Start: 2020-03-24 | End: 2020-04-22

## 2020-03-24 RX ORDER — LEVOFLOXACIN 500 MG/1
500 TABLET, FILM COATED ORAL DAILY
Qty: 10 TABLET | Refills: 0 | Status: SHIPPED | OUTPATIENT
Start: 2020-03-24 | End: 2020-03-24

## 2020-03-24 NOTE — TELEPHONE ENCOUNTER
Reason for call:  Other   Patient called regarding (reason for call): call back  Additional comments: Pt has follow up questions about his appointment today    Phone number to reach patient:  Home number on file [phone] (home)    Best Time:  n/a    Can we leave a detailed message on this number?  YES    Travel screening: Not Applicable

## 2020-03-24 NOTE — TELEPHONE ENCOUNTER
Spoke with patient and answered questions regarding his diagnosis of epididymitis. Verbalized understanding    Lala Don RN   [facility]

## 2020-03-24 NOTE — PATIENT INSTRUCTIONS
Patient Education     Epididymitis  Inflammation of the epididymis can cause pain and swelling in your scrotum. The epididymis is a small tube next to the testicle that stores sperm. Epididymitis is usually caused by an infection. In sexually active men, it is often caused by a sexually transmitted disease (STD) such as chlamydia or gonorrhea. In boys and in men over 40, it can be from bacteria from other parts of the urinary tract (not an STD infection).  Symptoms may begin with pain in the lower belly (abdomen) or low back. The pain then spreads down into the scrotum. Usually only one side is affected. The testicle and scrotum swell and become very painful and red. You may have fever and a burning when passing urine. Sometimes you may have a discharge from the penis.  Treatment is with antibiotics, and anti-inflammatory and pain medicines. The condition should get better over the first few days of treatment. But it will take several weeks for all the swelling and discomfort to go away. If your healthcare provider suspects that an STD is the cause, your sexual partners may need to be treated.  Home care  The following will help you care for yourself at home:    Support the scrotum. When lying down, place a rolled towel under the scrotum. When walking, use an athletic supporter or 2 pairs of jockey-style underwear.    To relieve pain, put ice packs on the inflamed area. You can make your own ice pack by putting ice cubes in a sealed plastic bag wrapped in a thin towel.    You may use over-the-counter medicines to control pain, unless another medicine was given. If you have chronic liver or kidney disease, talk with your healthcare provider before taking these medicines. Also talk with your provider if you've ever had a stomach ulcer or GI bleeding.    Rest in bed for the first few days until the fever, pain, and swelling get better. It may take several weeks for all of the swelling to go away.    Constipation can  make you strain. This makes the pain worse. Avoid constipation by eating natural laxatives such as prunes, fresh fruits, and whole-grain cereals. If necessary, use a mild over-the-counter laxative for constipation. Mineral oil can be used to keep the stools soft.    Do not have sex until you have finished all treatment and all symptoms have cleared.    Take all medicine as directed. Do not miss any doses and do not stop early even if you feel better.  Follow-up care  Follow up with your healthcare provider, or as advised, to be sure you are responding properly to treatment. If a culture was taken, you may call for the result as directed. A culture test can ensure that you are on the correct antibiotic.   When to seek medical advice  Call your healthcare provider right away if any of these occur:    Fever of 100.4 F (38 C), or as directed by your healthcare provider    Increasing pain or swelling of the testicle after starting treatment    Pressure or pain in your bladder that gets worse    Unable to pass urine for 8 hours  Date Last Reviewed: 9/1/2016 2000-2019 [employer]. [address] [place] [address], [place] [address]. All rights reserved. This information is not intended as a substitute for professional medical care. Always follow your healthcare professional's instructions.

## 2020-03-24 NOTE — TELEPHONE ENCOUNTER
Reason for call:  Symptom      Symptom or request: discomfort in testicles     Duration (how long have symptoms been present): start something in January like 2 months ago but don't know when    Have you been treated for this before? No    Additional comments: pt have discomfort in testicles and want to discuss with Dr. Dai about it    Phone number to reach patient:  Home number on file [phone] (home)    Best Time:  Anytime of the day    Can we leave a detailed message on this number?  YES    Travel screening: Negative

## 2020-03-24 NOTE — PROGRESS NOTES
Rigo Li is a 24 year old male who presents to clinic today for the following health issues:    HPI   Concern - Testicular pain  Onset: 2 months.     Description: Sharp- infrequent. Every couple days it will come on.     Intensity: 2-3/10 when it happens.     Progression of Symptoms:  No changes in those two months and waxing and waning    Accompanying Signs & Symptoms:  Nothing on the surface.     Previous history of similar problem:   No first occurrence.     Precipitating factors:   Worsened by: Certain movements but hasn't been frequent enough to notice a pattern.     Alleviating factors:  Improved by: Goes away on its own.     Therapies Tried and outcome: NA    Committed relationship for 3 years, female, he had one other partner years ago and that partner had 2 others. He has never had std but has never been tested    No lesions but did have tiny bit of abnormal pussy bloody penile discharge once today         Patient Active Problem List   Diagnosis     Other acne     Acne     External hemorrhoids     Epithelial cyst     Past Surgical History:   Procedure Laterality Date     NO HISTORY OF SURGERY         Social History     Tobacco Use     Smoking status: Never Smoker     Smokeless tobacco: Never Used   Substance Use Topics     Alcohol use: Yes     Comment: 1/week     Family History   Problem Relation Age of Onset     Cardiovascular Maternal Grandmother 35        sudden cardiac death     Diabetes Paternal Grandfather      Cerebrovascular Disease Paternal Grandfather      Prostate Cancer Paternal Grandfather      Arthritis Paternal Grandmother      Arthritis Maternal Grandfather      Depression Maternal Grandfather      Eye Disorder Maternal Grandfather      Family History Negative Mother      Family History Negative Father          Current Outpatient Medications   Medication Sig Dispense Refill     levofloxacin (LEVAQUIN) 500 MG tablet Take 1 tablet (500 mg) by mouth daily 10 tablet 0      Allergies   Allergen Reactions     Cats      BP Readings from Last 3 Encounters:   03/24/20 132/68   04/09/19 130/78   04/09/18 116/70    Wt Readings from Last 3 Encounters:   03/24/20 64.5 kg (142 lb 1.6 oz)   04/09/19 65.8 kg (145 lb)   04/09/18 66.1 kg (145 lb 11.2 oz)                    Reviewed and updated as needed this visit by Provider         Review of Systems   ROS COMP: Constitutional, HEENT, cardiovascular, pulmonary, GI, , musculoskeletal, neuro, skin, endocrine and psych systems are negative, except as otherwise noted.      Objective    /68   Pulse 50   Temp 98.5  F (36.9  C) (Oral)   Wt 64.5 kg (142 lb 1.6 oz)   SpO2 97%   BMI 22.30 kg/m    Body mass index is 22.3 kg/m .  Physical Exam   GENERAL: healthy, alert and no distress  EYES: Eyes grossly normal to inspection, PERRL and conjunctivae and sclerae normal  HENT: ear canals and TM's normal, nose and mouth without ulcers or lesions  RESP: Respiratory rate regular and breathing easily   CV: No abnormal color and extremities warm   ABDOMEN: soft, nontender, no hernias   (male): testicles normal without atrophy or masses, epididymal enlargement left, no hernias and penis normal without urethral discharge  MS: no gross musculoskeletal defects noted, no edema  SKIN: no suspicious lesions or rashes  NEURO: Normal strength and tone, mentation intact and speech normal  PSYCH: mentation appears normal, affect normal/bright    Diagnostic Test Results:  Labs reviewed in Epic  No results found for this or any previous visit (from the past 24 hour(s)).        Assessment & Plan       ICD-10-CM    1. Epididymitis  N45.1 levofloxacin (LEVAQUIN) 500 MG tablet     DISCONTINUED: levofloxacin (LEVAQUIN) 500 MG tablet   2. Need for vaccination  Z23    3. Screen for STD (sexually transmitted disease)  Z11.3 NEISSERIA GONORRHOEA PCR     CHLAMYDIA TRACHOMATIS PCR     HIV Antigen Antibody Combo     Treponema Abs w Reflex to RPR and Titer   4. Testicular  pain, left  N50.812 CANCELED: UA with Microscopic reflex to Culture   low risk for std, never tested so did recommend he do, will do levaquin for this and if any std positive would notify to treat   Otherwise urinating and stool fine, no fever or hernia symptoms   Notify if not improving with this or any concerns, see patient instructions        Patient Instructions     Patient Education     Epididymitis  Inflammation of the epididymis can cause pain and swelling in your scrotum. The epididymis is a small tube next to the testicle that stores sperm. Epididymitis is usually caused by an infection. In sexually active men, it is often caused by a sexually transmitted disease (STD) such as chlamydia or gonorrhea. In boys and in men over 40, it can be from bacteria from other parts of the urinary tract (not an STD infection).  Symptoms may begin with pain in the lower belly (abdomen) or low back. The pain then spreads down into the scrotum. Usually only one side is affected. The testicle and scrotum swell and become very painful and red. You may have fever and a burning when passing urine. Sometimes you may have a discharge from the penis.  Treatment is with antibiotics, and anti-inflammatory and pain medicines. The condition should get better over the first few days of treatment. But it will take several weeks for all the swelling and discomfort to go away. If your healthcare provider suspects that an STD is the cause, your sexual partners may need to be treated.  Home care  The following will help you care for yourself at home:    Support the scrotum. When lying down, place a rolled towel under the scrotum. When walking, use an athletic supporter or 2 pairs of jockey-style underwear.    To relieve pain, put ice packs on the inflamed area. You can make your own ice pack by putting ice cubes in a sealed plastic bag wrapped in a thin towel.    You may use over-the-counter medicines to control pain, unless another medicine  was given. If you have chronic liver or kidney disease, talk with your healthcare provider before taking these medicines. Also talk with your provider if you've ever had a stomach ulcer or GI bleeding.    Rest in bed for the first few days until the fever, pain, and swelling get better. It may take several weeks for all of the swelling to go away.    Constipation can make you strain. This makes the pain worse. Avoid constipation by eating natural laxatives such as prunes, fresh fruits, and whole-grain cereals. If necessary, use a mild over-the-counter laxative for constipation. Mineral oil can be used to keep the stools soft.    Do not have sex until you have finished all treatment and all symptoms have cleared.    Take all medicine as directed. Do not miss any doses and do not stop early even if you feel better.  Follow-up care  Follow up with your healthcare provider, or as advised, to be sure you are responding properly to treatment. If a culture was taken, you may call for the result as directed. A culture test can ensure that you are on the correct antibiotic.   When to seek medical advice  Call your healthcare provider right away if any of these occur:    Fever of 100.4 F (38 C), or as directed by your healthcare provider    Increasing pain or swelling of the testicle after starting treatment    Pressure or pain in your bladder that gets worse    Unable to pass urine for 8 hours  Date Last Reviewed: 9/1/2016 2000-2019 [employer]. [address] [place] [address], [place] [address]. All rights reserved. This information is not intended as a substitute for professional medical care. Always follow your healthcare professional's instructions.               Return in about 1 year (around 3/24/2021) for next annual exam or as needed.    [facility]IR[facility]

## 2020-03-24 NOTE — TELEPHONE ENCOUNTER
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    Additional Information    Negative: Rash or color change of scrotum BUT no swelling or pain    Negative: Followed a genital injury (e.g., penis, scrotum)    Negative: Swelling of scrotum is main symptom    Negative: SEVERE pain (e.g., excruciating)    Negative: Swollen scrotum    Negative: Constant pain in scrotum or testicle and present > 1 hour    Negative: Vomiting    Negative: Fever > 100.5 F (38.1 C)    Negative: Patient sounds very sick or weak to the triager    Negative: Scrotum looks infected (e.g., draining sore, ulcer, red rash)    Negative: Blood in urine (red, pink, or tea-colored)    Pain comes and goes (intermittent) and present > 24 hours    Protocols used: SCROTAL PAIN-A-OH    Patient scheduled for a visit today at 1:40 with provider.    Lala Don RN   [facility]

## 2020-03-25 LAB
C TRACH DNA SPEC QL NAA+PROBE: NEGATIVE
HIV 1+2 AB+HIV1 P24 AG SERPL QL IA: NONREACTIVE
N GONORRHOEA DNA SPEC QL NAA+PROBE: NEGATIVE
SPECIMEN SOURCE: NORMAL
SPECIMEN SOURCE: NORMAL
T PALLIDUM AB SER QL: NONREACTIVE

## 2020-04-22 ENCOUNTER — OFFICE VISIT (OUTPATIENT)
Dept: FAMILY MEDICINE | Facility: CLINIC | Age: 25
End: 2020-04-22
Payer: COMMERCIAL

## 2020-04-22 VITALS
WEIGHT: 145.5 LBS | DIASTOLIC BLOOD PRESSURE: 74 MMHG | RESPIRATION RATE: 18 BRPM | BODY MASS INDEX: 22.84 KG/M2 | TEMPERATURE: 97.7 F | HEIGHT: 67 IN | SYSTOLIC BLOOD PRESSURE: 114 MMHG | HEART RATE: 60 BPM | OXYGEN SATURATION: 99 %

## 2020-04-22 DIAGNOSIS — K40.90 LEFT GROIN HERNIA: Primary | ICD-10-CM

## 2020-04-22 DIAGNOSIS — K40.90 UNILATERAL INGUINAL HERNIA WITHOUT OBSTRUCTION OR GANGRENE, RECURRENCE NOT SPECIFIED: ICD-10-CM

## 2020-04-22 PROCEDURE — 99213 OFFICE O/P EST LOW 20 MIN: CPT | Performed by: NURSE PRACTITIONER

## 2020-04-22 ASSESSMENT — MIFFLIN-ST. JEOR: SCORE: 1602.48

## 2020-04-22 NOTE — PATIENT INSTRUCTIONS
Patient Education     What Is a Hernia?    A hernia is when an organ or tissue pushes through a weak area in the belly (abdominal) wall. This weak area may be present at birth. Or it may be caused by abdominal strain over time. If not treated, a hernia can get worse with time and physical stress.  When a bulge forms  When there is a weak area in the abdominal wall, an organ or tissue can push outward. This often causes a bulge that you can see under your skin. The bulge may get bigger when you stand up. It may go away when you lie down. You may also feel some pressure or mild pain when lifting, coughing, urinating, or doing other activities.  Types of hernias  The type of hernia you have depends on its location. Most hernias form in the groin at or near the internal ring. This is the entrance to a canal between the abdomen and groin. Hernias can also occur in the abdomen, thigh, or genitals.    An incisional hernia occurs at the site of a previous surgical incision.    An umbilical hernia occurs at the navel.    An indirect inguinal hernia occurs in the groin at the internal ring.    A direct inguinal hernia occurs in the groin near the internal ring.    A femoral hernia occurs just below the groin.    An epigastric hernia occurs in the upper abdomen at the midline.  Diagnosis  In most cases, your healthcare provider can diagnose a hernia by doing a physical exam.  In some cases it might not be clear why you have a swelling in the belly wall. Then your provider may order an imaging test such as an ultrasound. This can help with the diagnosis.  Surgery  A hernia will not heal on its own. Surgery is needed to fix the weak spot in the abdominal wall. If not treated, a hernia can get larger. It can also cause serious health problems. The good news is that hernia surgery can be done quickly and safely. In some cases, you can go home the same day as your surgery.   When to call your provider  Call your healthcare  provider right away if the swelling around your hernia becomes larger, firmer, or more painful. These may be signs that your intestines are stuck in the abdominal wall. This is an emergency. The hernia must be repaired right away to avoid serious problems.  Date Last Reviewed: 7/1/2016 2000-2019 [employer]. [address] [place] [address], [place] [address]. All rights reserved. This information is not intended as a substitute for professional medical care. Always follow your healthcare professional's instructions.           Patient Education     Hernia Repair Surgery  A hernia (or  rupture ) is a weakness or defect in the wall of the abdomen. A hernia will not heal on its own. Surgery is needed to repair the defect in the abdominal wall. If not treated, a hernia can get larger. It can also lead to serious health complications. Fortunately hernia surgery can be done quickly and safely. Below is an overview of hernia repair surgery.  Preparing for surgery  Your healthcare provider will talk with you about getting ready for surgery. Follow all the instructions you re given and be sure to:    Tell your healthcare provider about any medicines, supplements, or herbs you take. This includes both prescription and over-the-counter items. Ask if you should stop taking any of them.    Stop taking aspirin, ibuprofen, naproxen, and other NSAIDs 7 to 14 days before surgery.    Arrange for an adult family member or friend to give you a ride home after surgery.    Stop smoking. Smoking affects blood flow and can slow healing.    Gently wash the surgical area the night before surgery.    Follow any directions you are given for not eating or drinking before surgery.  The day of surgery  Arrive at the hospital or surgical center at your scheduled time. You ll be asked to change into a patient gown. You ll then be given an IV to provide fluids and medicine. Shortly before surgery, an anesthesiologist or nurse anesthetist will  talk with you. He or she will explain the types of anesthesia used to prevent pain during surgery. You will have one or more of the following:    Monitored sedation to make you relaxed and sleepy.    Local anesthesia to numb the surgical site.    Regional anesthesia to numb specific areas of your body.    General anesthesia to let you sleep during surgery.  Fixing the weakness  Surgery treats a hernia by repairing the weakness in the abdominal wall. Most hernias are treated using  tension-free  repairs. This is surgery that uses special mesh materials to repair the weak area. The mesh covers the weak area like a patch. The mesh is made of strong, flexible plastic that stays in the body. Over time, nearby tissues grow into the mesh to strengthen the repair.  After surgery  When the procedure is over, you ll be taken to the recovery area to rest. Your blood pressure and heart rate will be monitored. You ll also have a bandage over the surgical site. To help reduce discomfort, you ll be given pain medicines. You may also be given breathing exercises to keep your lungs clear. Later you ll be asked to get up and walk. This helps prevent blood clots in the legs. You can go home when your healthcare provider says you re ready.     Risks and possible complications of hernia surgery    Bleeding    Infection    Numbness or pain in the groin or leg    Risk the hernia will recur    Damage to the testicles or testicular function   Anesthesia risks    Mesh complications    Inability to urinate    Bowel or bladder injury       Date Last Reviewed: 10/1/2016    1420-4585 [employer]. [address] [place] [address], [place] [address]. All rights reserved. This information is not intended as a substitute for professional medical care. Always follow your healthcare professional's instructions.

## 2020-04-22 NOTE — PROGRESS NOTES
"Subjective     Ar Li is a 24 year old male who presents to clinic today for the following health issues:    HPI   Testicular Pain      Duration: 2 months    Description (location/character/radiation): Left testicle and left side of groin    Intensity:  mild    Accompanying signs and symptoms: Not since he was treated at his last visit-swelling and patient states that his epididymus     History (similar episodes/previous evaluation): None    Precipitating or alleviating factors: None    Therapies tried and outcome: 10 day antibiotic-LEVOFLOXACIN 500 MG TABS     Did improve maybe slightly with antibiotics did entire course--did add it was Slow to start working, day 4-5 felt a bit better  Did get back to normal, then about a week later he did start having symptoms again  Left testicle with little swelling maybe   Left groin pain   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      Objective    /74   Pulse 60   Temp 97.7  F (36.5  C) (Oral)   Resp 18   Ht 1.7 m (5' 6.93\")   Wt 66 kg (145 lb 8 oz)   SpO2 99%   BMI 22.84 kg/m    Body mass index is 22.84 kg/m .  Physical Exam   GENERAL: healthy, alert and no distress  RESP: Respiratory rate regular and breathing easily   CV: No abnormal color and extremities warm, normal femoral pulses   ABDOMEN: soft, nontender, no hepatosplenomegaly, no masses and bowel sounds normal   (male): testicles normal without atrophy or masses, hernia left groin and penis normal without urethral discharge  MS: no gross musculoskeletal defects noted, no " edema  SKIN: no suspicious lesions or rashes  NEURO: Normal strength and tone, mentation intact and speech normal  PSYCH: mentation appears normal, affect normal/bright    Diagnostic Test Results:  Labs reviewed in Epic        Assessment & Plan       ICD-10-CM    1. Left groin hernia  K40.90 GENERAL SURG ADULT REFERRAL   2. Unilateral inguinal hernia without obstruction or gangrene, recurrence not specified  K40.90       will refer for hernia consult, recommend Dr. 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    KIRK Cabral CNP  [facility] [place]

## 2020-04-23 ENCOUNTER — PATIENT OUTREACH (OUTPATIENT)
Dept: SURGERY | Facility: CLINIC | Age: 25
End: 2020-04-23

## 2020-04-23 ENCOUNTER — VIRTUAL VISIT (OUTPATIENT)
Dept: SURGERY | Facility: CLINIC | Age: 25
End: 2020-04-23
Payer: COMMERCIAL

## 2020-04-23 DIAGNOSIS — K40.20 BILATERAL INGUINAL HERNIA WITHOUT OBSTRUCTION OR GANGRENE, RECURRENCE NOT SPECIFIED: Primary | ICD-10-CM

## 2020-04-23 DIAGNOSIS — K40.90 LEFT GROIN HERNIA: ICD-10-CM

## 2020-04-23 RX ORDER — CEFAZOLIN SODIUM 1 G/50ML
1 INJECTION, SOLUTION INTRAVENOUS SEE ADMIN INSTRUCTIONS
Status: CANCELLED | OUTPATIENT
Start: 2020-04-23

## 2020-04-23 RX ORDER — CEFAZOLIN SODIUM 2 G/50ML
2 SOLUTION INTRAVENOUS
Status: CANCELLED | OUTPATIENT
Start: 2020-04-23

## 2020-04-23 ASSESSMENT — PAIN SCALES - GENERAL: PAINLEVEL: MILD PAIN (2)

## 2020-04-23 NOTE — PROGRESS NOTES
"Ar Li is a 24 year old male who is being evaluated via a billable video visit.      The patient has been notified of following:     \"This video visit will be conducted via a call between you and your physician/provider. We have found that certain health care needs can be provided without the need for an in-person physical exam.  This service lets us provide the care you need with a video conversation.  If a prescription is necessary we can send it directly to your pharmacy.  If lab work is needed we can place an order for that and you can then stop by our lab to have the test done at a later time.    Video visits are billed at different rates depending on your insurance coverage.  Please reach out to your insurance provider with any questions.    If during the course of the call the physician/provider feels a video visit is not appropriate, you will not be charged for this service.\"    Patient has given verbal consent for Video visit? Yes    How would you like to obtain your AVS? James    Patient would like the video invitation sent by: Text to cell phone: [phone]    Will anyone else be joining your video visit? No        Video-Visit Details  Ar Li is a 24 year old male with a several month history of a left inguinal mass with the following symptoms of pain.   Has noted pan at right intermmitently.  Finding was not work related.  Onset did occur with lifting.  Obstructive symptoms:  no  Urinary difficulties:  no  Chronic cough: no  Constipation:  no  Current level of activity:  Medium, seasonal work, dog sleds winter, [employer] summer.    Past medical and surgical history, medications, allergies, family history, and social history were reviewed with the patient. No family history hernia. Currently living with parents.    ROS: 10 point review of systems negative except noted in HPI  Patient seen at [facility] where left inguinal hernia confirmed.  Impression:  Inguinal hernia, left but " most likely bilateral given history. I will confirm in pre op exam, but will schedule as bilateral and change as needed.  Recommendation:  Laparoscopic bilateral Inguinal Hernioplasty Robot assisted    A full discussion regarding the alternatives, risks, goals, and potential complications for this surgery was completed today.  The patient understood I would use non recalled mesh and  that the potential problems included but are not limited to:  Infection, bleeding, hematoma, seroma, recurrence, injury to nerve/muscle or involved testicle(if male), ischemic orchitis(if male), and chronic pain.    The patient verbally expressed understanding, was given the opportunity for questions, and gives full informed consent for the procedure.      Today the patient was instructed on the need for a preoperative H&P, NPO status prior to surgery, and the need to stop anticoagulants prior to surgery.  Additional educational material, soap, and instructions will be mailed out to the patients home.    The total time spent with this patient and his chart was 30 minutes.  Of this time, greater than 50% was spent counseling and coordinating care.                Type of service:  Video Visit    Video Start Time: 10:30  Video End Time: 10:45    Originating Location (pt. Location): Home    Distant Location (provider location):  [facility] GENERAL SURGERY     Mode of Communication:  Video Conference via [employer]      JAKY

## 2020-04-23 NOTE — NURSING NOTE
Chief Complaint   Patient presents with     Consult     H consult, no imaging       There were no vitals filed for this visit.    There is no height or weight on file to calculate BMI.    Faraz Orourke, EMT

## 2020-04-23 NOTE — PROGRESS NOTES
Attempted to reach patient to review surgical requirements, procedure, recovery, etc.  No answer. LM on VM to call office.  Direct contact information provided.

## 2020-04-23 NOTE — PATIENT INSTRUCTIONS
You met with Dr. Chema Mike.      Today's visit instructions:    Reminder:  Surgery Requirements  1. Your surgery will be at [address] [place] [address] or [facility]  2. You will need to arrive 1 1/2 to 2 hours early based on the location of your surgery.  3. You will need someone to drive you home (over 18 years old) and stay with you for 24 hours after the procedure  4. You will need a preop physical with your regular doctor (or PAC if requested by your surgeon) within 30 days of surgery- closer is always better  5. Stop any blood thinners, vitamins, minerals, or herbal supplements 5 days before surgery.  If you are taking a prescribed blood thinner please let us know for specific instructions  6. Fasting- a nurse from [facility] will call you 1-2 days before surgery to confirm your procedure and tell you when to stop eating and drinking  7. Wash with the soap the night before surgery and morning of surgery. See instructions in the Surgery Packet.  8. If you would like a procedure estimate please call Floridalma ANDRES Financial Counselor, [phone].    If you have questions please contact Mike RN or Tami RN during regular clinic hours, Monday through Friday 7:30 AM - 4:00 PM, or you can contact us via [employer] at anytime.       If you have urgent needs after-hours, weekends, or holidays please call the hospital at [phone] and ask to speak with our on-call General Surgery Team.    Appointment [phone], option #1   Nurse Advice (Mike or Tami): [phone]   Surgery Scheduler (Ladan): [phone]  Fax: [phone]    After Your Robotic Inguinal Hernia Repair         Incision care     Remove the bandage the day after surgery, but leave the medical tape (Steri-Strips) or glue in place. These will loosen and fall off on their own 5 to 7 days after surgery.     You may take a shower the day after surgery. Carefully wash your incision with soap and water. Do not submerge yourself  in water (bath, whirlpool, hot tub, pool, lake) for 14 days after surgery.       Always wash your hands before touching your incisions or removing bandages.     It is not unusual to form a collection of fluid or blood under your incision that may feel firm or squishy- it can take several weeks to months for your body to reabsorb it.  At times, it may even drain.  If that should happen keep the area clean with soap, water,  and cover with a clean gauze dressing. You can change this daily or as needed.     Other medicines     Wait to start aspirin or blood thinners until the day after surgery. You can continue your regular medicines at your normal time the day after surgery.     Your pain medicine may cause constipation (hard, dry stools). To help with this, take the stool softener your doctor gave you or an over-the-counter stool softener or laxative. You can stop taking this when you are no longer taking pain medicine and your bowel movements are back to normal.      For pain or discomfort     Take the narcotic pain medicine your doctor gave you as needed and as instructed on the bottle. If you prefer to use over-the-counter medication, use acetaminophen (Tylenol) or ibuprofen (Advil, Motrin) as instructed on the box. Do not take Tylenol if it is in your narcotic pain medication.     Use an ice pack on your groin for 20 minutes at a time as needed for the first 24 hours. Be sure to protect your skin by putting a cloth between the ice pack and your skin.     After 24 hours you can switch to heat for 20 minutes as needed. Be sure to protect your skin by putting a cloth between the heat pack and your skin.     It is normal to feel a lump in your groin after surgery. This lump may take up to 8 weeks to go away.      Activities     No driving until you feel it s safe to do so. Don t drive while taking narcotic pain medicine.     You can return to your other activities as normal, no restrictions.      Special equipment      If we gave you an athletic supporter, wear this for the first 3 days after surgery. You can wear it longer than this if you wish.      Diet     You can eat your regular meals after surgery.      When to call the doctor   Call your doctor if you have:     A fever above 101 F (38.3 C) (taken under the tongue), or a fever or chills lasting more than a day.     Redness at the incision site.     Any fluid or blood draining from the incision, especially if it smells bad.      Severe pain that doesn t improve with pain medicine.      Go to the emergency room if:   You can t urinate (pee) or feel pressure when you urinate. We will place a small, thin tube (catheter) to empty your bladder. This needs to stay in place for at least 2 days.      We will call you 2 to 4 days after surgery to review this handout, answer questions and help arrange after-surgery care. If you have questions or concerns, please call [phone] during regular office hours. If you need to call after business hours, call [phone] and ask to page the surgeon on-call.

## 2020-04-27 ENCOUNTER — PATIENT OUTREACH (OUTPATIENT)
Dept: SURGERY | Facility: CLINIC | Age: 25
End: 2020-04-27

## 2020-04-27 NOTE — PROGRESS NOTES
Pre and Post op Patient Education/Teaching Flowsheet  Relevant Diagnosis:  Inguinal hernia  Teaching Topic:  Pre and post op teaching  Person(s) Involved in teaching:  Patient     Motivation Level:  Asks Questions:  Yes  Eager to Learn:  Yes  Cooperative:  Yes  Receptive (willing/able to accept information):  Yes  Any cultural factors/Orthodoxy beliefs that may influence understanding or compliance?  No    Patient/caregiver/family demonstrates understanding of the following:  Reason for the appointment, diagnosis, and treatment plan:  Yes  Patient demonstrates understanding of the following:  Pre-op bowel prep:  No  Post-op pain management recommendations (medications, ice compress, binder/athletic supporter (if applicable), etc.:  Yes  Inguinal hernia patients:  Post-op urinary retention- discussed signs/symptoms and visit to ER for Strong catheter placement and to stay in place for at least 48 hours:  Yes  Restrictions:  Yes  Medications to take the day of surgery:  Per PCP  Blood thinner medications discussed and when to stop (if applicable):  Yes  Wound care:  Yes  Diabetes medication management (if applicable):  Per PCP  Which situations necessitate calling provider and whom to contact:  Discussed how to contact the hospital, nurse, and clinic scheduling staff if necessary      Date and time of surgery:  Yes  Location of surgery: [address] [place] [address] or [facility] [place]  History and Physical and any other testing necessary prior to surgery:  Yes  Required time line for completion of History and Physical and any pre-op testing:  Yes. Pre op with PCP.  Discuss need for someone to drive patient home and stay with them for 24 hours:  Yes  Pre-op showering/scrub information with Surgical Scrub:  Yes  NPO Guidelines:  NPO per Anesthesia Guidelines    Infection Prevention: Patient demonstrates understanding of the following:  Patient instructed on hand hygiene:  Yes  Surgical procedure site care will  be taught and will be reviewed at the time of discharge  Signs and symptoms of infection taught:  Yes  Wound care reviewed and will be taught at the time of discharge  Central venous catheter care will be taught at the time of discharge (if applicable)    Post-op follow-up:  Instructional materials used/given/mailed:  [place] Surgery Booklet, post op teaching sheet, Map, Soap, and arrival/location information    Surgical instructions mailed to patient

## 2020-06-01 DIAGNOSIS — Z11.59 ENCOUNTER FOR SCREENING FOR OTHER VIRAL DISEASES: Primary | ICD-10-CM

## 2020-06-01 PROBLEM — K40.20 BILATERAL INGUINAL HERNIA WITHOUT OBSTRUCTION OR GANGRENE, RECURRENCE NOT SPECIFIED: Status: ACTIVE | Noted: 2020-06-01

## 2020-06-02 ENCOUNTER — VIRTUAL VISIT (OUTPATIENT)
Dept: FAMILY MEDICINE | Facility: OTHER | Age: 25
End: 2020-06-02

## 2020-06-02 ENCOUNTER — NURSE TRIAGE (OUTPATIENT)
Dept: NURSING | Facility: CLINIC | Age: 25
End: 2020-06-02

## 2020-06-02 NOTE — TELEPHONE ENCOUNTER
FNA triage call :   Presenting problem :  Pt called . Diarrhea started 48 hours ago . Currently : pushing fluids and last voided within the hour ,  Eating crackers , and bread , and activity = sleeping more and homebound activity is unchanged .   Guideline used : Covid suspected  A AH .   Disposition and recommendations : COVID 19 Nurse Triage Plan/Patient Instructions    Please be aware that novel coronavirus (COVID-19) may be circulating in the community. If you develop symptoms such as fever, cough, or SOB or if you have concerns about the presence of another infection including coronavirus (COVID-19), please contact your health care provider or visit [email or web address].     Disposition/Instructions    Patient to stay at home and follow home care protocol based instructions.   Additional COVID19 information to add for patients.     Additional General Information About COVID-19    Whether or not you've been tested for COVID-19    Stay home and away from others (self-isolate) until:    At least 10 days have passed since your symptoms started. And     You've had no fever--and no medicine that reduces fever--for 3 full days (72 hours). And      Your other symptoms have resolved (gotten better).     During this time:    Stay in your own room (and use your own bathroom), if you can.    Stay away from others in your home. No hugging, kissing or shaking hands.    No visitors.    Don't go to work, school or anywhere else.     Clean  high touch  surfaces often (doorknobs, counters, handles, etc.). Use a household cleaning spray or wipes.    Cover your mouth and nose with a mask, tissue or wash cloth to avoid spreading germs.    Wash your hands and face often. Use soap and water.    For more tips, go to https://www.cdc.gov/coronavirus/2019-ncov/downloads/10Things.pdf.    How can I take care of myself?    1. Get lots of rest. Drink extra fluids (unless a doctor has told you not to).     2. Take Tylenol (acetaminophen) for fever  or pain. If you have liver or kidney problems, ask your family doctor if it's okay to take Tylenol.     Adults can take either:     650 mg (two 325 mg pills) every 4 to 6 hours, or     1,000 mg (two 500 mg pills) every 8 hours as needed.     Note: Don't take more than 3,000 mg in one day.   Acetaminophen is found in many medicines (both prescribed and over-the-counter medicines). Read all labels to be sure you don't take too much.   For children, check the Tylenol bottle for the right dose. The dose is based on  the child's age or weight.    3. If you have other health problems (like cancer, heart failure, an organ transplant or severe kidney disease): Call your specialty clinic if you don't feel better in the next 2 days.    4. Know when to call 911: If your breathing is so bad that it keeps you from doing normal activities, call 911 or go to the emergency room. Tell them that you've been staying home and may have COVID-19..      What are the symptoms of COVID-19?     The most common symptoms are cough, fever and trouble breathing.     Less common symptoms include body aches, chills, diarrhea (loose, watery poops), fatigue (feeling very tired), headache, runny nose, sore throat and loss of smell.     COVID-19 can cause severe coughing (bronchitis) and lung infection (pneumonia).    How does it spread?     The virus may spread when a person coughs or sneezes into the air. The virus can travel about 6 feet this way, and it can live on surfaces.      Common  (household disinfectants) will kill the virus.    Who is at risk?  Anyone can catch COVID-19 if they're around someone who has the virus.    How can others protect themselves?     Stay away from people who have COVID-19 (or symptoms of COVID-19).    Wash hands often with soap and water. Or, use hand  with at least 60% alcohol.    Avoid touching the eyes, nose or mouth.     Wear a face mask when you go out in public, when sick or when caring for a  sick person.      For more about COVID-19 and caring for yourself at home, please visit the CDC website at https://www.cdc.gov/coronavirus/2019-ncov/about/steps-when-sick.html.     To learn about care at [facility], go to [email or web address].    Below are the COVID-19 hotlines at the [place] Department of Health ([facility]). Interpreters are available.     For health questions: Call 905-336-4727 or 1-906.995.3163 (7 a.m. to 7 p.m.)    For questions about schools and childcare: Call 612-614-9206 or 1-833.217.7108 (7 a.m. to 7 p.m.)      Thank you for limiting contact with others, wearing a simple mask to cover your cough, practice good hand hygiene habits and accessing our virtual services where possible to limit the spread of this virus.    For more information about COVID19 and options for caring for yourself at home, please visit the CDC website at https://www.cdc.gov/coronavirus/2019-ncov/about/steps-when-sick.html  For more options for care at [facility], please visit our website at [email or web address]    For more information, please use the [place] Department of Health COVID-19 Website: https://www.health.[facility].[place].us/diseases/coronavirus/index.html  [place] [facility] of [facility] ([facility]) COVID-19 Hotlines (Interpreters available):      Health questions: Phone Number: [phone] or [phone] and Hours: 7 a.m. to 7 p.m.    Schools and  questions: Phone Number: [phone] or [phone] and Hours 7 a.m. to 7 p.m.                 Caller verbalizes understanding and denies further questions and will call back if further symptoms to triage or questions  . Renate Elias RN  - [facility] Nurse Advisor     Reason for Disposition    [1] COVID-19 infection diagnosed or suspected AND [2] mild symptoms (fever, cough) AND [3] no trouble breathing or other complications    Additional Information    Negative: SEVERE difficulty breathing  (e.g., struggling for each breath, speaks in single words)    Negative: Difficult to awaken or acting confused (e.g., disoriented, slurred speech)    Negative: Bluish (or gray) lips or face now    Negative: Shock suspected (e.g., cold/pale/clammy skin, too weak to stand, low BP, rapid pulse)    Negative: Sounds like a life-threatening emergency to the triager    Negative: [1] COVID-19 suspected (e.g., cough, fever, shortness of breath) AND [2] mild symptoms AND [3] public health department recommends testing    Negative: [1] COVID-19 exposure AND [2] no symptoms    Negative: COVID-19 and Breastfeeding, questions about    Negative: SEVERE or constant chest pain (Exception: mild central chest pain, present only when coughing)    Negative: MODERATE difficulty breathing (e.g., speaks in phrases, SOB even at rest, pulse 100-120)    Negative: Patient sounds very sick or weak to the triager    Negative: MILD difficulty breathing (e.g., minimal/no SOB at rest, SOB with walking, pulse <100)    Negative: Chest pain    Negative: Fever > 103 F (39.4 C)    Negative: [1] Fever > 101 F (38.3 C) AND [2] age > 60    Negative: [1] Fever > 100.0 F (37.8 C) AND [2] bedridden (e.g., nursing home patient, CVA, chronic illness, recovering from surgery)    Negative: HIGH RISK patient (e.g., age > 64 years, diabetes, heart or lung disease, weak immune system)    Negative: Fever present > 3 days (72 hours)    Negative: [1] Fever returns after gone for over 24 hours AND [2] symptoms worse or not improved    Negative: [1] Continuous (nonstop) coughing interferes with work or school AND [2] no improvement using cough treatment per protocol    Negative: Cough present > 3 weeks    [facility] Specific Disposition  - REQUIRED: [facility] Specific Patient Instructions  COVID 19 Nurse Triage Plan/Patient Instructions    Please be aware that novel coronavirus (COVID-19) may be circulating in the community. If you develop symptoms such as  fever, cough, or SOB or if you have concerns about the presence of another infection including coronavirus (COVID-19), please contact your health care provider or visit [email or web address].       Patient to stay at home and follow home care protocol based instructions.   Patient to have an [facility] Visit with a provider (Preferred option). Follow System Ambulatory Workflow for COVID 19.     To do this follow these instructions:    1. Go to the website [email or web address]  2. Create an account (you will need your insurance information)  3. Start a new visit  4. Choose your diagnosis (e.g. COVID19)  5. Fill out the information about your symptoms  6. A provider will reach out to you by text, phone call or video visit based on your request    Call Back If: Your symptoms worsen before you are able to complete your [facility] Visit with a provider.    Thank you for limiting contact with others, wearing a simple mask to cover your cough, practice good hand hygiene habits and accessing our virtual services where possible to limit the spread of this virus.    For more information about COVID19 and options for caring for yourself at home, please visit the CDC website at https://www.cdc.gov/coronavirus/2019-ncov/about/steps-when-sick.html  For more options for care at [facility] [place], please visit our website at [email or web address]    For more information, please use the [place] Department of Health ([facility]) COVID-19 Hotlines (Interpreters available):   Health questions: Phone Number: [phone] or [phone] and Hours: 7 a.m. to 7 p.m.  Schools and  questions: Phone Number: [phone] or [phone] and Hours 7 a.m. to 7 p.m.    Protocols used: CORONAVIRUS (COVID-19) DIAGNOSED OR [id number] 4.22.20

## 2020-06-02 NOTE — PROGRESS NOTES
"Date: 2020 11:54:52  Clinician: Neymar Salinas  Clinician NPI: [id number]  Patient: Ar Li  Patient : 1995  Patient Address: [address] [place] [address]  Patient Phone: [phone]  Visit Protocol: URI  Patient Summary:  Ar is a 25 year old ( : 1995 ) male who initiated a Visit for COVID-19 (Coronavirus) evaluation and screening. When asked the question \"Please sign me up to receive news, health information and promotions. \", Ar responded \"No\".    Ar states his symptoms started 1-2 days ago.   His symptoms consist of diarrhea, malaise, and myalgia. Ar also feels feverish but was unable to measure his temperature.   Ar denies having wheezing, nausea, teeth pain, ageusia, sore throat, enlarged lymph nodes, anosmia, facial pain or pressure, cough, nasal congestion, vomiting, rhinitis, ear pain, headache, and chills. He also denies having recent facial or sinus surgery in the past 60 days and taking antibiotic medication for the symptoms. He is not experiencing dyspnea.   Precipitating events  He has not recently been exposed to someone with influenza. Ar has been in close contact with the following high risk individuals: adults 65 or older and people with asthma, heart disease or diabetes.   Pertinent COVID-19 (Coronavirus) information  In the past 14 days, Ar has not worked in a congregate living setting.   He does not work or volunteer as healthcare worker or a  and does not work or volunteer in a healthcare facility.   Ar also has not lived in a congregate living setting in the past 14 days. He does not live with a healthcare worker.   Ar has not had a close contact with a laboratory-confirmed COVID-19 patient within 14 days of symptom onset.   Pertinent medical history  Ar does not need a return to work/school note.   Weight: 140 lbs   Ar does not smoke or use smokeless tobacco.   Additional information as reported by the " patient (free text): Pretty severe diarrhea for 48 hrs. Ran fever last night. Was in [place] on canoe trip May 18th to 24th did not filter all of our water (foolishly).  Nurse call earlier today told me to start this process for a covid 19 test.   Weight: 140 lbs    MEDICATIONS: No current medications, ALLERGIES: NKDA  Clinician Response:  Dear Ar Joyner,  Two issues seem apparent. Diarrhea related to the water on the trip is very much a concern. Also, diarrhea is a symptom of the covid. Let's do two things:  1. Let's test you for ovid. Call [phone] and they will schedule you for testing.  2. Infectious diarrhea could be from the water, food-related, or even a virus. Let's treat you with an antibiotic but stool cultures may be needed in 3 days if no better. There is a concern for a bacteria called giardia if the diarrhea doesn't get better. Hydrate well. Anti-diarrhea medications from the drug store may help.  Contact your PCP or us if no better in 3 days or if getting worse.       Diagnosis: Cough  Diagnosis ICD: R05  Prescription: ciprofloxacin HCl (Cipro) 500 mg oral tablet 6 tablet, 3 days supply. Take 1 tablet by mouth every 12 hours for 3 days. Refills: 0, Refill as needed: no, Allow substitutions: yes  Pharmacy: [id number] - [phone] - [address] [place] [address]

## 2020-06-03 DIAGNOSIS — Z11.59 ENCOUNTER FOR SCREENING FOR OTHER VIRAL DISEASES: ICD-10-CM

## 2020-06-03 PROCEDURE — 87635 SARS-COV-2 COVID-19 AMP PRB: CPT | Performed by: SURGERY

## 2020-06-03 PROCEDURE — 99000 SPECIMEN HANDLING OFFICE-LAB: CPT | Performed by: SURGERY

## 2020-06-04 LAB
SARS-COV-2 RNA SPEC QL NAA+PROBE: NOT DETECTED
SPECIMEN SOURCE: NORMAL

## 2020-06-05 ENCOUNTER — VIRTUAL VISIT (OUTPATIENT)
Dept: FAMILY MEDICINE | Facility: CLINIC | Age: 25
End: 2020-06-05
Payer: COMMERCIAL

## 2020-06-05 DIAGNOSIS — A09 GASTROINTESTINAL INFECTION: Primary | ICD-10-CM

## 2020-06-05 PROCEDURE — 99213 OFFICE O/P EST LOW 20 MIN: CPT | Mod: TEL | Performed by: NURSE PRACTITIONER

## 2020-06-05 NOTE — PROGRESS NOTES
"Ar Li is a 25 year old male who is being evaluated via a billable telephone visit.      The patient has been notified of following:     \"This telephone visit will be conducted via a call between you and your physician/provider. We have found that certain health care needs can be provided without the need for a physical exam.  This service lets us provide the care you need with a short phone conversation.  If a prescription is necessary we can send it directly to your pharmacy.  If lab work is needed we can place an order for that and you can then stop by our lab to have the test done at a later time.    Telephone visits are billed at different rates depending on your insurance coverage. During this emergency period, for some insurers they may be billed the same as an in-person visit.  Please reach out to your insurance provider with any questions.    If during the course of the call the physician/provider feels a telephone visit is not appropriate, you will not be charged for this service.\"    Patient has given verbal consent for Telephone visit?  Yes    What phone number would you like to be contacted at? [phone]    How would you like to obtain your AVS? James    Subjective     Ar Li is a 25 year old male who presents via phone visit today for the following health issues:    HPI     On Sunday this past weekend started to feel really off with diarrhea.  Had fever and diarrhea Monday night extending into Tuesday.  Lost appetite and generalized abdominal cramping described as \"roiling waves.\".  No nausea or vomiting.  Did not have blood in the stool.    Called to get treatment and he was put into covid management on [facility].  Was tested for covid and treated with 3 day course of ciprofloxacin. Also took immodium.  History notable for [employer] trip when not entire water was filtered - known giardia possibility.    Symptoms have gotten better - afebrile and having solid stools.  He is " contacting us because he wanted to know if he is done with treatment.    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  He is leaving this weekend to go stay with GF and her mother for awhile.  Has hernia surgery in July.    I have reviewed and updated the patient's Past Medical History, Social History, Family History and Medication List      Reviewed and updated as needed this visit by Provider  Problems  Med Hx  Surg Hx  Fam Hx         Review of Systems   Constitutional, HEENT, cardiovascular, pulmonary, gi and gu systems are negative, except as otherwise noted.       Objective   Reported vitals:  There were no vitals taken for this visit.   healthy, alert and no distress  PSYCH: Alert and oriented times 3; coherent speech, normal   rate and volume, able to articulate logical thoughts, able   to abstract reason, no tangential thoughts, no hallucinations   or delusions  His affect is normal and pleasant  RESP: No cough, no audible wheezing, able to talk in full sentences  Remainder of exam unable to be completed due to telephone visits    Diagnostic Test Results:  Labs reviewed in Epic  none         Assessment/Plan:  1. Gastrointestinal infection  Finish antibiotics, advance food cautiously and add fermented foods.      Return in about 1 month (around 7/5/2020) for pre-op.    End:  4:02 PM   Start:  3:46    Phone call duration:  16 minutes    KIRK Bull CNP

## 2020-06-05 NOTE — PATIENT INSTRUCTIONS
Advance and add food back in slowly - start with blander food  Make a point of eating fermented foods like yogurt, kefir, kombucha

## 2020-06-25 DIAGNOSIS — Z11.59 ENCOUNTER FOR SCREENING FOR OTHER VIRAL DISEASES: Primary | ICD-10-CM

## 2020-07-01 ENCOUNTER — OFFICE VISIT (OUTPATIENT)
Dept: FAMILY MEDICINE | Facility: CLINIC | Age: 25
End: 2020-07-01
Payer: COMMERCIAL

## 2020-07-01 VITALS
RESPIRATION RATE: 16 BRPM | BODY MASS INDEX: 22.44 KG/M2 | SYSTOLIC BLOOD PRESSURE: 110 MMHG | WEIGHT: 143 LBS | HEART RATE: 77 BPM | OXYGEN SATURATION: 99 % | TEMPERATURE: 97.6 F | HEIGHT: 67 IN | DIASTOLIC BLOOD PRESSURE: 60 MMHG

## 2020-07-01 DIAGNOSIS — K40.20 BILATERAL INGUINAL HERNIA WITHOUT OBSTRUCTION OR GANGRENE, RECURRENCE NOT SPECIFIED: ICD-10-CM

## 2020-07-01 DIAGNOSIS — L23.7 CONTACT DERMATITIS DUE TO POISON IVY: ICD-10-CM

## 2020-07-01 DIAGNOSIS — Z01.818 PREOP GENERAL PHYSICAL EXAM: Primary | ICD-10-CM

## 2020-07-01 PROCEDURE — 99214 OFFICE O/P EST MOD 30 MIN: CPT | Performed by: FAMILY MEDICINE

## 2020-07-01 ASSESSMENT — MIFFLIN-ST. JEOR: SCORE: 1586.15

## 2020-07-01 ASSESSMENT — PAIN SCALES - GENERAL: PAINLEVEL: NO PAIN (0)

## 2020-07-01 NOTE — PROGRESS NOTES
[facility]  [address]  [address]  [facility] [address]  [phone]  Dept: [phone]    PRE-OP EVALUATION:  Today's date: 2020    Ar Li (: 1995) presents for pre-operative evaluation assessment as requested by Dr. Mike.  He requires evaluation and anesthesia risk assessment prior to undergoing surgery/procedure for treatment of  Hernia  .    Proposed Surgery/ Procedure: HERNIORRHAPHY, INGUINAL, ROBOT-ASSISTED   Date of Surgery/ Procedure: 07/10/20  Time of Surgery/ Procedure: 9:40 am    Hospital/Surgical Facility: [facility]   Fax number for surgical facility:  Fax [phone]  Primary Physician: Carlos Dai  Type of Anesthesia Anticipated: General    Patient has a Health Care Directive or Living Will:  NO    1. NO - Do you have a history of heart attack, stroke, stent, bypass or surgery on an artery in the head, neck, heart or legs?  2. NO - Do you ever have any pain or discomfort in your chest?  3. NO - Do you have a history of  Heart Failure?  4. NO - Are you troubled by shortness of breath when: walking on the level, up a slight hill or at night?  5. NO - Do you currently have a cold, bronchitis or other respiratory infection?  6. NO - Do you have a cough, shortness of breath or wheezing?  7. NO - Do you sometimes get pains in the calves of your legs when you walk?  8. NO - Do you or anyone in your family have previous history of blood clots?  9. NO - Do you or does anyone in your family have a serious bleeding problem such as prolonged bleeding following surgeries or cuts?  10. NO - Have you ever had problems with anemia or been told to take iron pills?  11. NO - Have you had any abnormal blood loss such as black, tarry or bloody stools, or abnormal vaginal bleeding?  12. NO - Have you ever had a blood transfusion?  13. NO - Have you or any of your relatives ever had problems with anesthesia?  14. NO - Do you have sleep apnea,  "excessive snoring or daytime drowsiness?  15. NO - Do you have any prosthetic heart valves?  16. NO - Do you have prosthetic joints?  17. NO - Is there any chance that you may be pregnant?      HPI:     HPI related to upcoming procedure: left > right groin symptoms     MEDICAL HISTORY:     Patient Active Problem List    Diagnosis Date Noted     Bilateral inguinal hernia without obstruction or gangrene, recurrence not specified 06/01/2020     Priority: Medium     Added automatically from request for surgery [id number]       Left groin hernia 04/22/2020     Priority: Medium     Epithelial cyst 04/09/2018     Priority: Medium     External hemorrhoids 04/08/2018     Priority: Medium     Acne 01/11/2013     Priority: Medium     Other acne      Priority: Medium     Acne        Past Medical History:   Diagnosis Date     Other acne     Acne     Past Surgical History:   Procedure Laterality Date     NO HISTORY OF SURGERY       No current outpatient medications on file.     OTC products: Benadryl oral, Calamine, cortaide creams    Allergies   Allergen Reactions     Cats       Latex Allergy: NO    Social History     Tobacco Use     Smoking status: Never Smoker     Smokeless tobacco: Never Used   Substance Use Topics     Alcohol use: Yes     Comment: 1/week     History   Drug Use No       REVIEW OF SYSTEMS:   Constitutional, neuro, ENT, endocrine, pulmonary, cardiac, gastrointestinal, genitourinary, musculoskeletal, integument and psychiatric systems are negative, except as otherwise noted.    EXAM:   /60   Pulse 77   Temp 97.6  F (36.4  C) (Temporal)   Resp 16   Ht 1.7 m (5' 6.93\")   Wt 64.9 kg (143 lb)   SpO2 99%   BMI 22.44 kg/m      GENERAL APPEARANCE: healthy, alert and no distress     EYES: EOMI,  PERRL     HENT: ear canals and TM's normal and nose and mouth without ulcers or lesions     NECK: no adenopathy, no asymmetry, masses, or scars and thyroid normal to palpation     RESP: lungs clear to auscultation - no " rales, rhonchi or wheezes     CV: regular rates and rhythm, normal S1 S2, no S3 or S4 and no murmur, click or rub     ABDOMEN:  soft, nontender, no HSM or masses and bowel sounds normal     MS: extremities normal- no gross deformities noted, no evidence of inflammation in joints, FROM in all extremities.     SKIN: vesicle - arms and torso widely scattered in clumps without sign of infection     NEURO: Normal strength and tone, sensory exam grossly normal, mentation intact and speech normal     PSYCH: mentation appears normal. and affect normal/bright     LYMPHATICS: No cervical adenopathy    DIAGNOSTICS:   No labs or EKG required for low risk surgery (cataract, skin procedure, breast biopsy, etc)    No results for input(s): HGB, PLT, INR, NA, POTASSIUM, CR, A1C in the last 17298 hours.     IMPRESSION:   Reason for surgery/procedure: bilat inguinal hernias, left >right     The proposed surgical procedure is considered LOW risk.    REVISED CARDIAC RISK INDEX  The patient has the following serious cardiovascular risks for perioperative complications such as (MI, PE, VFib and 3  AV Block):  No serious cardiac risks  INTERPRETATION: 0 risks: Class I (very low risk - 0.4% complication rate)    The patient has the following additional risks for perioperative complications:  No identified additional risks      ICD-10-CM    1. Preop general physical exam  Z01.818    2. Bilateral inguinal hernia without obstruction or gangrene, recurrence not specified  K40.20    3. Contact dermatitis due to poison ivy  L23.7        RECOMMENDATIONS:   - patient will continue poison ivy treatment    --Patient is on no chronic medications    APPROVAL GIVEN to proceed with proposed procedure, without further diagnostic evaluation       Signed Electronically by: Carlos Dai MD    Copy of this evaluation report is provided to requesting physician.

## 2020-07-07 DIAGNOSIS — Z11.59 ENCOUNTER FOR SCREENING FOR OTHER VIRAL DISEASES: ICD-10-CM

## 2020-07-07 LAB
SARS-COV-2 RNA SPEC QL NAA+PROBE: NOT DETECTED
SPECIMEN SOURCE: NORMAL

## 2020-07-07 PROCEDURE — U0003 INFECTIOUS AGENT DETECTION BY NUCLEIC ACID (DNA OR RNA); SEVERE ACUTE RESPIRATORY SYNDROME CORONAVIRUS 2 (SARS-COV-2) (CORONAVIRUS DISEASE [COVID-19]), AMPLIFIED PROBE TECHNIQUE, MAKING USE OF HIGH THROUGHPUT TECHNOLOGIES AS DESCRIBED BY CMS-2020-01-R: HCPCS | Performed by: SURGERY

## 2020-07-09 ENCOUNTER — ANESTHESIA EVENT (OUTPATIENT)
Dept: SURGERY | Facility: CLINIC | Age: 25
End: 2020-07-09
Payer: COMMERCIAL

## 2020-07-09 NOTE — ANESTHESIA PREPROCEDURE EVALUATION
"Anesthesia Pre-Procedure Evaluation    Patient: Ar Li   MRN:     [id number] Gender:   male   Age:    25 year old :      1995        Preoperative Diagnosis: Bilateral inguinal hernia without obstruction or gangrene, recurrence not specified [K40.20]   Procedure(s):  HERNIORRHAPHY, INGUINAL, ROBOT-ASSISTED     LABS:  CBC:   Lab Results   Component Value Date    WBC 6.7 2011     BMP: No results found for: NA, POTASSIUM, CHLORIDE, CO2, BUN, CR, GLC  COAGS: No results found for: PTT, INR, FIBR  POC: No results found for: BGM, HCG, HCGS  OTHER: No results found for: PH, LACT, A1C, LOLIS, PHOS, MAG, ALBUMIN, PROTTOTAL, ALT, AST, GGT, ALKPHOS, BILITOTAL, BILIDIRECT, LIPASE, AMYLASE, EM, TSH, T4, T3, CRP, SED     Preop Vitals    BP Readings from Last 3 Encounters:   20 110/60   20 114/74   20 132/68    Pulse Readings from Last 3 Encounters:   20 77   20 60   20 50      Resp Readings from Last 3 Encounters:   20 16   20 18   11 14    SpO2 Readings from Last 3 Encounters:   20 99%   20 99%   20 97%      Temp Readings from Last 1 Encounters:   20 36.4  C (97.6  F) (Temporal)    Ht Readings from Last 1 Encounters:   20 1.7 m (5' 6.93\")      Wt Readings from Last 1 Encounters:   20 64.9 kg (143 lb)    Estimated body mass index is 22.44 kg/m  as calculated from the following:    Height as of 20: 1.7 m (5' 6.93\").    Weight as of 20: 64.9 kg (143 lb).     LDA:        Past Medical History:   Diagnosis Date     Other acne     Acne      Past Surgical History:   Procedure Laterality Date     NO HISTORY OF SURGERY        Allergies   Allergen Reactions     Cats         Anesthesia Evaluation     .             ROS/MED HX    ENT/Pulmonary:  - neg pulmonary ROS     Neurologic:  - neg neurologic ROS     Cardiovascular:  - neg cardiovascular ROS       METS/Exercise Tolerance:     Hematologic:  - neg hematologic  ROS  "      Musculoskeletal:  - neg musculoskeletal ROS       GI/Hepatic: Comment: Bilateral inguinal hernias        Renal/Genitourinary:  - ROS Renal section negative       Endo:  - neg endo ROS       Psychiatric:         Infectious Disease:         Malignancy:         Other:                         PHYSICAL EXAM:   Mental Status/Neuro:    Airway: Facies: Feasible  Mallampati: I  TM distance: > 6 cm  Neck ROM: Full   Respiratory: Auscultation: CTAB     Resp. Rate: Normal      CV: Rhythm: Regular  Heart: Normal Sounds   Comments:      Dental: Normal Dentition                Assessment:   ASA SCORE: 1    H&P: History and physical reviewed and following examination; no interval change.   Smoking Status:  Non-Smoker/Unknown        Plan:   Anes. Type:  General   Pre-Medication: Acetaminophen; Gabapentin   Induction:  IV (Standard)   Airway: ETT; Oral   Access/Monitoring: PIV   Maintenance: Balanced     Postop Plan:   Postop Pain: Opioids  Postop Sedation/Airway: Not planned  Disposition: Outpatient     PONV Management:   Adult Risk Factors:, Non-Smoker, Postop Opioids   Prevention: Ondansetron, Dexamethasone     CONSENT: Direct conversation   Plan and risks discussed with: Patient   Blood Products: Consent Deferred (Minimal Blood Loss)                   Karthik Castaneda MD

## 2020-07-10 ENCOUNTER — ANESTHESIA (OUTPATIENT)
Dept: SURGERY | Facility: CLINIC | Age: 25
End: 2020-07-10
Payer: COMMERCIAL

## 2020-07-10 ENCOUNTER — HOSPITAL ENCOUNTER (OUTPATIENT)
Facility: CLINIC | Age: 25
Discharge: HOME | End: 2020-07-10
Attending: SURGERY | Admitting: SURGERY
Payer: COMMERCIAL

## 2020-07-10 VITALS
HEART RATE: 69 BPM | WEIGHT: 140.43 LBS | HEIGHT: 66 IN | SYSTOLIC BLOOD PRESSURE: 135 MMHG | BODY MASS INDEX: 22.57 KG/M2 | TEMPERATURE: 97.1 F | DIASTOLIC BLOOD PRESSURE: 72 MMHG | RESPIRATION RATE: 14 BRPM | OXYGEN SATURATION: 98 %

## 2020-07-10 DIAGNOSIS — K40.20 BILATERAL INGUINAL HERNIA WITHOUT OBSTRUCTION OR GANGRENE, RECURRENCE NOT SPECIFIED: ICD-10-CM

## 2020-07-10 DIAGNOSIS — K40.90 LEFT GROIN HERNIA: Primary | ICD-10-CM

## 2020-07-10 LAB — GLUCOSE BLDC GLUCOMTR-MCNC: 82 MG/DL (ref 70–99)

## 2020-07-10 PROCEDURE — 36000086 ZZH SURGERY LEVEL 8 1ST 30 MIN UMMC: Performed by: SURGERY

## 2020-07-10 PROCEDURE — 71000027 ZZH RECOVERY PHASE 2 EACH 15 MINS: Performed by: SURGERY

## 2020-07-10 PROCEDURE — 25000125 ZZHC RX 250: Performed by: NURSE ANESTHETIST, CERTIFIED REGISTERED

## 2020-07-10 PROCEDURE — 27210794 ZZH OR GENERAL SUPPLY STERILE: Performed by: SURGERY

## 2020-07-10 PROCEDURE — 71000015 ZZH RECOVERY PHASE 1 LEVEL 2 EA ADDTL HR: Performed by: SURGERY

## 2020-07-10 PROCEDURE — 40000170 ZZH STATISTIC PRE-PROCEDURE ASSESSMENT II: Performed by: SURGERY

## 2020-07-10 PROCEDURE — 25800030 ZZH RX IP 258 OP 636: Performed by: NURSE ANESTHETIST, CERTIFIED REGISTERED

## 2020-07-10 PROCEDURE — 37000008 ZZH ANESTHESIA TECHNICAL FEE, 1ST 30 MIN: Performed by: SURGERY

## 2020-07-10 PROCEDURE — 82962 GLUCOSE BLOOD TEST: CPT

## 2020-07-10 PROCEDURE — 25000128 H RX IP 250 OP 636: Performed by: ANESTHESIOLOGY

## 2020-07-10 PROCEDURE — C1781 MESH (IMPLANTABLE): HCPCS | Performed by: SURGERY

## 2020-07-10 PROCEDURE — 25000566 ZZH SEVOFLURANE, EA 15 MIN: Performed by: SURGERY

## 2020-07-10 PROCEDURE — 71000014 ZZH RECOVERY PHASE 1 LEVEL 2 FIRST HR: Performed by: SURGERY

## 2020-07-10 PROCEDURE — 36000088 ZZH SURGERY LEVEL 8 EA 15 ADDTL MIN - UMMC: Performed by: SURGERY

## 2020-07-10 PROCEDURE — 25000125 ZZHC RX 250: Performed by: SURGERY

## 2020-07-10 PROCEDURE — 25000132 ZZH RX MED GY IP 250 OP 250 PS 637: Performed by: ANESTHESIOLOGY

## 2020-07-10 PROCEDURE — 25000128 H RX IP 250 OP 636: Performed by: NURSE ANESTHETIST, CERTIFIED REGISTERED

## 2020-07-10 PROCEDURE — 25000128 H RX IP 250 OP 636: Performed by: SURGERY

## 2020-07-10 PROCEDURE — 37000009 ZZH ANESTHESIA TECHNICAL FEE, EACH ADDTL 15 MIN: Performed by: SURGERY

## 2020-07-10 DEVICE — MESH PROGRIP LAPAROSCOPIC 5.9X3.9" PARIETEX SELF-FIX LPG1510: Type: IMPLANTABLE DEVICE | Site: ABDOMEN | Status: FUNCTIONAL

## 2020-07-10 RX ORDER — ONDANSETRON 2 MG/ML
INJECTION INTRAMUSCULAR; INTRAVENOUS PRN
Status: DISCONTINUED | OUTPATIENT
Start: 2020-07-10 | End: 2020-07-10

## 2020-07-10 RX ORDER — LIDOCAINE 40 MG/G
CREAM TOPICAL
Status: DISCONTINUED | OUTPATIENT
Start: 2020-07-10 | End: 2020-07-10 | Stop reason: HOSPADM

## 2020-07-10 RX ORDER — HYDROMORPHONE HYDROCHLORIDE 1 MG/ML
.3-.5 INJECTION, SOLUTION INTRAMUSCULAR; INTRAVENOUS; SUBCUTANEOUS EVERY 10 MIN PRN
Status: DISCONTINUED | OUTPATIENT
Start: 2020-07-10 | End: 2020-07-10 | Stop reason: HOSPADM

## 2020-07-10 RX ORDER — ONDANSETRON 2 MG/ML
4 INJECTION INTRAMUSCULAR; INTRAVENOUS EVERY 30 MIN PRN
Status: DISCONTINUED | OUTPATIENT
Start: 2020-07-10 | End: 2020-07-10 | Stop reason: HOSPADM

## 2020-07-10 RX ORDER — LIDOCAINE HYDROCHLORIDE 20 MG/ML
INJECTION, SOLUTION INFILTRATION; PERINEURAL PRN
Status: DISCONTINUED | OUTPATIENT
Start: 2020-07-10 | End: 2020-07-10

## 2020-07-10 RX ORDER — CEFAZOLIN SODIUM 2 G/100ML
2 INJECTION, SOLUTION INTRAVENOUS
Status: COMPLETED | OUTPATIENT
Start: 2020-07-10 | End: 2020-07-10

## 2020-07-10 RX ORDER — CEFAZOLIN SODIUM 1 G/3ML
1 INJECTION, POWDER, FOR SOLUTION INTRAMUSCULAR; INTRAVENOUS SEE ADMIN INSTRUCTIONS
Status: DISCONTINUED | OUTPATIENT
Start: 2020-07-10 | End: 2020-07-10 | Stop reason: HOSPADM

## 2020-07-10 RX ORDER — SODIUM CHLORIDE, SODIUM LACTATE, POTASSIUM CHLORIDE, CALCIUM CHLORIDE 600; 310; 30; 20 MG/100ML; MG/100ML; MG/100ML; MG/100ML
INJECTION, SOLUTION INTRAVENOUS CONTINUOUS
Status: DISCONTINUED | OUTPATIENT
Start: 2020-07-10 | End: 2020-07-10 | Stop reason: HOSPADM

## 2020-07-10 RX ORDER — HYDROCODONE BITARTRATE AND ACETAMINOPHEN 5; 325 MG/1; MG/1
1-2 TABLET ORAL EVERY 4 HOURS PRN
Qty: 15 TABLET | Refills: 0 | Status: SHIPPED | OUTPATIENT
Start: 2020-07-10

## 2020-07-10 RX ORDER — EPHEDRINE SULFATE 50 MG/ML
INJECTION, SOLUTION INTRAMUSCULAR; INTRAVENOUS; SUBCUTANEOUS PRN
Status: DISCONTINUED | OUTPATIENT
Start: 2020-07-10 | End: 2020-07-10

## 2020-07-10 RX ORDER — FENTANYL CITRATE 50 UG/ML
25-50 INJECTION, SOLUTION INTRAMUSCULAR; INTRAVENOUS
Status: DISCONTINUED | OUTPATIENT
Start: 2020-07-10 | End: 2020-07-10 | Stop reason: HOSPADM

## 2020-07-10 RX ORDER — FENTANYL CITRATE 50 UG/ML
25-50 INJECTION, SOLUTION INTRAMUSCULAR; INTRAVENOUS EVERY 5 MIN PRN
Status: DISCONTINUED | OUTPATIENT
Start: 2020-07-10 | End: 2020-07-10 | Stop reason: HOSPADM

## 2020-07-10 RX ORDER — DEXAMETHASONE SODIUM PHOSPHATE 4 MG/ML
INJECTION, SOLUTION INTRA-ARTICULAR; INTRALESIONAL; INTRAMUSCULAR; INTRAVENOUS; SOFT TISSUE PRN
Status: DISCONTINUED | OUTPATIENT
Start: 2020-07-10 | End: 2020-07-10

## 2020-07-10 RX ORDER — GABAPENTIN 300 MG/1
300 CAPSULE ORAL ONCE
Status: COMPLETED | OUTPATIENT
Start: 2020-07-10 | End: 2020-07-10

## 2020-07-10 RX ORDER — PROPOFOL 10 MG/ML
INJECTION, EMULSION INTRAVENOUS PRN
Status: DISCONTINUED | OUTPATIENT
Start: 2020-07-10 | End: 2020-07-10

## 2020-07-10 RX ORDER — BUPIVACAINE HYDROCHLORIDE 5 MG/ML
INJECTION, SOLUTION EPIDURAL; INTRACAUDAL PRN
Status: DISCONTINUED | OUTPATIENT
Start: 2020-07-10 | End: 2020-07-10 | Stop reason: HOSPADM

## 2020-07-10 RX ORDER — OXYCODONE HYDROCHLORIDE 5 MG/1
5 TABLET ORAL ONCE
Status: CANCELLED | OUTPATIENT
Start: 2020-07-10

## 2020-07-10 RX ORDER — ONDANSETRON 4 MG/1
4 TABLET, ORALLY DISINTEGRATING ORAL EVERY 30 MIN PRN
Status: DISCONTINUED | OUTPATIENT
Start: 2020-07-10 | End: 2020-07-10 | Stop reason: HOSPADM

## 2020-07-10 RX ORDER — CELECOXIB 200 MG/1
200 CAPSULE ORAL ONCE
Status: COMPLETED | OUTPATIENT
Start: 2020-07-10 | End: 2020-07-10

## 2020-07-10 RX ORDER — FENTANYL CITRATE 50 UG/ML
INJECTION, SOLUTION INTRAMUSCULAR; INTRAVENOUS PRN
Status: DISCONTINUED | OUTPATIENT
Start: 2020-07-10 | End: 2020-07-10

## 2020-07-10 RX ORDER — NALOXONE HYDROCHLORIDE 0.4 MG/ML
.1-.4 INJECTION, SOLUTION INTRAMUSCULAR; INTRAVENOUS; SUBCUTANEOUS
Status: DISCONTINUED | OUTPATIENT
Start: 2020-07-10 | End: 2020-07-10 | Stop reason: HOSPADM

## 2020-07-10 RX ORDER — ACETAMINOPHEN 500 MG
1000 TABLET ORAL ONCE
Status: COMPLETED | OUTPATIENT
Start: 2020-07-10 | End: 2020-07-10

## 2020-07-10 RX ORDER — SODIUM CHLORIDE, SODIUM LACTATE, POTASSIUM CHLORIDE, CALCIUM CHLORIDE 600; 310; 30; 20 MG/100ML; MG/100ML; MG/100ML; MG/100ML
INJECTION, SOLUTION INTRAVENOUS CONTINUOUS PRN
Status: DISCONTINUED | OUTPATIENT
Start: 2020-07-10 | End: 2020-07-10

## 2020-07-10 RX ADMIN — SODIUM CHLORIDE, POTASSIUM CHLORIDE, SODIUM LACTATE AND CALCIUM CHLORIDE: 600; 310; 30; 20 INJECTION, SOLUTION INTRAVENOUS at 10:11

## 2020-07-10 RX ADMIN — HYDROMORPHONE HYDROCHLORIDE 0.5 MG: 1 INJECTION, SOLUTION INTRAMUSCULAR; INTRAVENOUS; SUBCUTANEOUS at 12:50

## 2020-07-10 RX ADMIN — SUGAMMADEX 200 MG: 100 INJECTION, SOLUTION INTRAVENOUS at 11:41

## 2020-07-10 RX ADMIN — MIDAZOLAM 2 MG: 1 INJECTION INTRAMUSCULAR; INTRAVENOUS at 10:11

## 2020-07-10 RX ADMIN — PROPOFOL 200 MG: 10 INJECTION, EMULSION INTRAVENOUS at 10:23

## 2020-07-10 RX ADMIN — ROCURONIUM BROMIDE 50 MG: 10 INJECTION INTRAVENOUS at 10:23

## 2020-07-10 RX ADMIN — ROCURONIUM BROMIDE 20 MG: 10 INJECTION INTRAVENOUS at 11:16

## 2020-07-10 RX ADMIN — FENTANYL CITRATE 50 MCG: 50 INJECTION, SOLUTION INTRAMUSCULAR; INTRAVENOUS at 10:40

## 2020-07-10 RX ADMIN — CELECOXIB 200 MG: 200 CAPSULE ORAL at 08:24

## 2020-07-10 RX ADMIN — LIDOCAINE HYDROCHLORIDE 100 MG: 20 INJECTION, SOLUTION INFILTRATION; PERINEURAL at 10:23

## 2020-07-10 RX ADMIN — HYDROMORPHONE HYDROCHLORIDE 0.5 MG: 1 INJECTION, SOLUTION INTRAMUSCULAR; INTRAVENOUS; SUBCUTANEOUS at 12:28

## 2020-07-10 RX ADMIN — GABAPENTIN 300 MG: 300 CAPSULE ORAL at 08:25

## 2020-07-10 RX ADMIN — DEXAMETHASONE SODIUM PHOSPHATE 8 MG: 4 INJECTION, SOLUTION INTRA-ARTICULAR; INTRALESIONAL; INTRAMUSCULAR; INTRAVENOUS; SOFT TISSUE at 10:32

## 2020-07-10 RX ADMIN — ACETAMINOPHEN 1000 MG: 500 TABLET, FILM COATED ORAL at 08:24

## 2020-07-10 RX ADMIN — ONDANSETRON 4 MG: 2 INJECTION INTRAMUSCULAR; INTRAVENOUS at 11:33

## 2020-07-10 RX ADMIN — Medication 5 MG: at 11:07

## 2020-07-10 RX ADMIN — Medication 1 TABLET: at 15:15

## 2020-07-10 RX ADMIN — FENTANYL CITRATE 50 MCG: 50 INJECTION, SOLUTION INTRAMUSCULAR; INTRAVENOUS at 12:21

## 2020-07-10 RX ADMIN — FENTANYL CITRATE 50 MCG: 50 INJECTION, SOLUTION INTRAMUSCULAR; INTRAVENOUS at 10:38

## 2020-07-10 RX ADMIN — CEFAZOLIN 2 G: 10 INJECTION, POWDER, FOR SOLUTION INTRAVENOUS at 10:28

## 2020-07-10 RX ADMIN — FENTANYL CITRATE 100 MCG: 50 INJECTION, SOLUTION INTRAMUSCULAR; INTRAVENOUS at 10:23

## 2020-07-10 RX ADMIN — FENTANYL CITRATE 50 MCG: 50 INJECTION, SOLUTION INTRAMUSCULAR; INTRAVENOUS at 12:40

## 2020-07-10 ASSESSMENT — PAIN DESCRIPTION - DESCRIPTORS
DESCRIPTORS: ACHING

## 2020-07-10 ASSESSMENT — MIFFLIN-ST. JEOR: SCORE: 1551.81

## 2020-07-10 NOTE — OP NOTE
Procedure Date: 07/10/2020      PREOPERATIVE DIAGNOSIS:  Bilateral inguinal hernia.      POSTOPERATIVE DIAGNOSIS:  Bilateral inguinal hernia.      OPERATIVE PROCEDURE:  Laparoscopic bilateral inguinal hernioplasty with mesh repair, robot-assisted.      SURGEON:  Chema Mike MD      ASSISTANT:  Harsha Roque MD, Surgery Resident      ANESTHESIA:  General endotracheal.      INDICATIONS FOR PROCEDURE:  The patient presents with bilateral inguinal hernias.  Informed consent was obtained.      OPERATIVE FINDINGS:  Bilateral inguinal direct hernia.      DETAILS OF OPERATIVE PROCEDURE:  The patient was brought to the operating room, put under general anesthesia, and abdomen was widely prepped and draped in the usual sterile fashion.  Supraumbilical skin incision was made.  Dissection carried down to anterior rectus fascia, which was opened.  Rectus pushed out laterally.  Posterior sheath secured with 0 Vicryl and Veress needle introduced.  Abdomen was then insufflated.  An 8 port was placed here with Jose Luis.  Then 8 ports were placed in the left and right lateral per routine technique.  The meshes were 15 x 10 cm Parietex ProGrip meshes.  These were rolled, placed in the abdominal cavity under direct vision, as well as the 3-0 Stratafix.  At this point, the robot was docked and my attention turned to the console where the peritoneum at the right and the left was opened in the preperitoneal space.  A Veress needle was placed per routine technique for localization and decompression of the preperitoneal space.  The dissection was then carried out, taking down the direct defect without difficulty, and the cords were peritonealized left and right.  The mesh was placed at the left, unrolled to completely cover the left myopectineal orifice, the mesh was then placed at the right and this was likewise unrolled.  It crossed the midline.  Both meshes were carried down to Cristofer's ligament.  The peritoneal defects were then  closed with running 3-0 Stratafix as the needles were removed under direct vision.  The Veress needle was opened with decompression of that space and good visualization with good placement of the mesh.  The abdomen was deflated.  Ports were removed.  Fascial defect was closed with 0 Vicryl, skin with subcuticular.  Steri-Strips were applied.  Estimated blood loss was minimal.  The patient tolerated the procedure well and was taken to the recovery room where he was without difficulty or apparent complication.         DARIEN HAMILTON MD             D: 07/10/2020   T: 07/10/2020   MT:       Name:     GORDO HA   MRN:      [id number]        Account:        [id number]   :      1995           Procedure Date: 07/10/2020      Document: [id number]       cc: [facility] Surgery Billing

## 2020-07-10 NOTE — OR NURSING
discharge instructions reviewed over the phone with pt's father Vlad.  All questions answered.  Pt's father to call when out front to  pt.

## 2020-07-10 NOTE — BRIEF OP NOTE
[facility] Brief Operative Note    Pre-operative diagnosis: Bilateral inguinal hernia without obstruction or gangrene, recurrence not specified [K40.20]   Post-operative diagnosis Same as Pre-op Dx   Procedure: Procedure(s):  HERNIORRHAPHY, INGUINAL, ROBOT-ASSISTED   Surgeon: Chema Mike MD   Assistants(s):    Estimated blood loss: 1 mL    Specimens: no   Findings: yes

## 2020-07-10 NOTE — ANESTHESIA CARE TRANSFER NOTE
Patient: Ar Li    Procedure(s):  HERNIORRHAPHY, INGUINAL, ROBOT-ASSISTED    Diagnosis: Bilateral inguinal hernia without obstruction or gangrene, recurrence not specified [K40.20]  Diagnosis Additional Information: No value filed.    Anesthesia Type:   General     Note:  Airway :Nasal Cannula  Patient transferred to:PACU  Comments:   Spontaneous respirations, oral suctioned, bilateral eye opening and hand grasps.  Extubated to NC O2 3lpm.  VSS to PACU.Handoff Report: Identifed the Patient, Identified the Reponsible Provider, Reviewed the pertinent medical history, Discussed the surgical course, Reviewed Intra-OP anesthesia mangement and issues during anesthesia, Set expectations for post-procedure period and Allowed opportunity for questions and acknowledgement of understanding      Vitals: (Last set prior to Anesthesia Care Transfer)    CRNA VITALS  7/10/2020 1127 - 7/10/2020 1205      7/10/2020             NIBP:  134/78    Pulse:  78    Resp Rate (observed):  12                Electronically Signed By: KIRK Bailey CRNA  July 10, 2020  12:05 PM

## 2020-07-10 NOTE — OR NURSING
After pt left unit and prior to pt getting in car, pt requested to take a pain pill.  Pt wheeled back up to 3C phase 2 and administered 1 tab of norco per orders.  Pt then taken back downstairs to car and father.

## 2020-07-10 NOTE — DISCHARGE INSTRUCTIONS
Take it easy when you get home.  Remember, same day surgery DOES NOT MEAN SAME DAY RECOVERY!  Healing is a gradual process.  You will need some time to recover - you may be more tired than you realize at first.  Rest and relax for at least the first 24 hours at home.  You'll feel better and heal faster if you take good care of yourself.           Tips for taking pain medications  To get the best pain relief possible , remember these points:      Take pain medications as directed, before pain becomes severe      Pain medication can upset your stomach: taking it with food may help      Constipation is a common side effect of pain medication. Drink plenty of  Fluids      Eat foods high in fiber. Take a stool softener  if recommended by your doctor or  Pharmacist.        Do not drink alcohol, drive or operate machinery while taking pain medications.      Ask about other ways to control pain, such as with heat, ice or relaxation.    [facility], [place]  Same-Day Surgery   Adult Discharge Orders & Instructions     For 24 hours after surgery    1. Get plenty of rest.  A responsible adult must stay with you for at least 24 hours after you leave the hospital.   2. Do not drive or use heavy equipment.  If you have weakness or tingling, don't drive or use heavy equipment until this feeling goes away.  3. Do not drink alcohol.  4. Avoid strenuous or risky activities.  Ask for help when climbing stairs.   5. You may feel lightheaded.  IF so, sit for a few minutes before standing.  Have someone help you get up.   6. If you have nausea (feel sick to your stomach): Drink only clear liquids such as apple juice, ginger ale, broth or 7-Up.  Rest may also help.  Be sure to drink enough fluids.  Move to a regular diet as you feel able.  7. You may have a slight fever. Call the doctor if your fever is over 100 F (37.7 C) (taken under the tongue) or lasts longer than 24 hours.  8. You may have a dry mouth, a  sore throat, muscle aches or trouble sleeping.  These should go away after 24 hours.  9. Do not make important or legal decisions.   Call your doctor for any of the followin.  Signs of infection (fever, growing tenderness at the surgery site, a large amount of drainage or bleeding, severe pain, foul-smelling drainage, redness, swelling).    2. It has been over 8 to 10 hours since surgery and you are still not able to urinate (pass water).    3.  Headache for over 24 hours.      To contact a doctor, call Dr. Mike @ [phone]  or:        [phone] and ask for the resident on call for          General Surgery  (answered 24 hours a day)- at night or on he weekend.       Emergency Department:    [facility]: [phone]       (TTY for hearing impaired: [phone])

## 2020-07-10 NOTE — ANESTHESIA POSTPROCEDURE EVALUATION
Anesthesia POST Procedure Evaluation    Patient: Ar Li   MRN:     [id number] Gender:   male   Age:    25 year old :      1995        Preoperative Diagnosis: Bilateral inguinal hernia without obstruction or gangrene, recurrence not specified [K40.20]   Procedure(s):  HERNIORRHAPHY, INGUINAL, ROBOT-ASSISTED   Postop Comments: No value filed.     Anesthesia Type: General       Disposition: Outpatient   Postop Pain Control: Uneventful            Sign Out: Well controlled pain   PONV: No   Neuro/Psych: Uneventful            Sign Out: Acceptable/Baseline neuro status   Airway/Respiratory: Uneventful            Sign Out: Acceptable/Baseline resp. status   CV/Hemodynamics: Uneventful            Sign Out: Acceptable CV status   Other NRE: NONE   DID A NON-ROUTINE EVENT OCCUR? No         Last Anesthesia Record Vitals:  CRNA VITALS  7/10/2020 1127 - 7/10/2020 1227      7/10/2020             NIBP:  134/78    Pulse:  78    Resp Rate (observed):  12          Last PACU Vitals:  Vitals Value Taken Time   /88 7/10/2020  1:20 PM   Temp 36.8  C (98.2  F) 7/10/2020  1:00 PM   Pulse 59 7/10/2020  1:20 PM   Resp 9 7/10/2020  1:21 PM   SpO2 100 % 7/10/2020  1:21 PM   Temp src     NIBP 134/78 7/10/2020 12:00 PM   Pulse 78 7/10/2020 12:00 PM   SpO2     Resp     Temp     Ht Rate     Temp 2     Vitals shown include unvalidated device data.      Electronically Signed By: Karthik Castaneda MD, July 10, 2020, 1:22 PM

## 2020-07-13 ENCOUNTER — PATIENT OUTREACH (OUTPATIENT)
Dept: SURGERY | Facility: CLINIC | Age: 25
End: 2020-07-13

## 2020-07-13 NOTE — PROGRESS NOTES
Ar Li is a patient of Dr. Chema Mike that underwent Davinci bilateral inguinal hernia repair approximately 3 days ago.  Attempted to contact patient via telephone for a status update and review post op teaching.  LM on  to call office.  Await return call.      Of note:  Pathology:  NA  Wound:  Steri-strips  Follow-up:  Routine  Restrictions:  - No activity restrictions  New medications:  Norco  Equipment/Supplies:  Athletic Supporter?      RN Post-Op/Post-Discharge Care Coordination Note    Spoke with Patient.    Support  Patient able to care for self independently     Health Status  Fevers/chills: Patient denies any fever or chills.  Nausea/Vomiting: Patient reports nausea and vomiting.  Eating/drinking: Patient is able to eat and drink without any complaints.  Bowel habits: Patient reports having a normal bowel movement.  Drains (TALIB): N/A  Incisions: Patient denies any signs and symptoms of infection..  Wound closure:  Steri-strips  Pain: patient has been using Ibuprofen for pain. He used 400 mg Q6H yesterday and hasn't used anything today. He has been using an ice pack prn.  New Medications:  Norco    Activity/Restrictions  Return to normal activites as tolerated    Equipment  Athletic Supporter    Pathology reviewed with patient:  N/A    Forms/Letters  No    All of his questions were answered including reviewing restrictions, and wound care.  He will call this office if he has any further questions and/or concerns.      In lieu of a post-op clinic patient that patient would like to be contacted in approximately 7-10 days via telephone.    A copy of this note was routed to the primary surgeon.      Whom and When to Call  Patient acknowledges understanding of how to manage any medication changes and   when to seek medical care.     Patient advised that if after hour medical concerns arise to please call [phone] and choose option 4 to speak to the physician on call.

## 2020-07-18 ENCOUNTER — TELEPHONE (OUTPATIENT)
Dept: SURGERY | Facility: CLINIC | Age: 25
End: 2020-07-18

## 2020-07-23 ENCOUNTER — PATIENT OUTREACH (OUTPATIENT)
Dept: SURGERY | Facility: CLINIC | Age: 25
End: 2020-07-23

## 2020-07-23 NOTE — PROGRESS NOTES
Ar Li was contacted several days post procedure via telephone for a status update and elected to have a telephone follow -up call approximately 7-10 days after original contact in lieu of a clinic visit with Dr. Chema Mike.  He continues to do well and the steri-strips have started to peel off. Patient contacted the on call GS team after having some increased tenderness to his left incision. He states since then the swelling has gotten better. It is still a bit more painful than the right side but he denies any signs of infection. The patients wounds are healed and the area is C/D/I.  He is afebrile, pain free, and van po; the patient is slowly resuming his normal activity.   Discussed restrictions including N/A.    Pathology was reviewed with the patient: N/A    All of Ar Li question's were answered and  he would like to return to the clinic on a PRN basis.  The patient is aware that  he may schedule a post op appointment at any time.    A copy of this note was routed to the patients surgeon.

## 2020-11-14 ENCOUNTER — HEALTH MAINTENANCE LETTER (OUTPATIENT)
Age: 25
End: 2020-11-14

## 2021-04-23 ENCOUNTER — OFFICE VISIT (OUTPATIENT)
Dept: FAMILY MEDICINE | Facility: CLINIC | Age: 26
End: 2021-04-23
Payer: COMMERCIAL

## 2021-04-23 VITALS
SYSTOLIC BLOOD PRESSURE: 130 MMHG | OXYGEN SATURATION: 98 % | HEART RATE: 56 BPM | BODY MASS INDEX: 24.58 KG/M2 | DIASTOLIC BLOOD PRESSURE: 70 MMHG | WEIGHT: 150 LBS | TEMPERATURE: 98.1 F

## 2021-04-23 DIAGNOSIS — M25.562 ACUTE PAIN OF LEFT KNEE: ICD-10-CM

## 2021-04-23 DIAGNOSIS — Z00.00 ROUTINE GENERAL MEDICAL EXAMINATION AT A HEALTH CARE FACILITY: Primary | ICD-10-CM

## 2021-04-23 PROCEDURE — 99395 PREV VISIT EST AGE 18-39: CPT | Performed by: FAMILY MEDICINE

## 2021-04-23 NOTE — PATIENT INSTRUCTIONS
Contact if knee pain worse, PT doesn't help. Start with XR    Preventive Health Recommendations  Male Ages 26 - 39    Yearly exam:             See your health care provider every year in order to  o   Review health changes.   o   Discuss preventive care.    o   Review your medicines if your doctor has prescribed any.    You should be tested each year for STDs (sexually transmitted diseases), if you re at risk.     After age 35, talk to your provider about cholesterol testing. If you are at risk for heart disease, have your cholesterol tested at least every 5 years.     If you are at risk for diabetes, you should have a diabetes test (fasting glucose).  Shots: Get a flu shot each year. Get a tetanus shot every 10 years.     Nutrition:    Eat at least 5 servings of fruits and vegetables daily.     Eat whole-grain bread, whole-wheat pasta and brown rice instead of white grains and rice.     Get adequate Calcium and Vitamin D.     Lifestyle    Exercise for at least 150 minutes a week (30 minutes a day, 5 days a week). This will help you control your weight and prevent disease.     Limit alcohol to one drink per day.     No smoking.     Wear sunscreen to prevent skin cancer.     See your dentist every six months for an exam and cleaning.

## 2021-04-23 NOTE — PROGRESS NOTES
SUBJECTIVE:   CC: Ar Li is an 25 year old male who presents for preventive health visit.       Patient has been advised of split billing requirements and indicates understanding: Yes  Healthy Habits:    Do you get at least three servings of calcium containing foods daily (dairy, green leafy vegetables, etc.)? yes    Amount of exercise or daily activities, outside of work: 4 day(s) per week    Problems taking medications regularly No    Medication side effects: No    Have you had an eye exam in the past two years? yes    Do you see a dentist twice per year? no    Do you have sleep apnea, excessive snoring or daytime drowsiness?no      Joint Pain    Onset: several months     Description:   Location: right knee  Character: Sharp and Dull ache    Intensity: moderate    Progression of Symptoms: same, intermittent    Accompanying Signs & Symptoms:  Other symptoms: weakness of leg only when pain is bad    History:   Previous similar pain: YES- last summer      Precipitating factors:   Trauma or overuse: uneven ground    Alleviating factors:  Improved by: rest/inactivity and ice    Therapies Tried and outcome: none        Today's PHQ-2 Score:   PHQ-2 ( 1999 [employer]) 3/24/2020 4/9/2018   Q1: Little interest or pleasure in doing things 0 0   Q2: Feeling down, depressed or hopeless 0 0   PHQ-2 Score 0 0   Q1: Little interest or pleasure in doing things - Not at all   Q2: Feeling down, depressed or hopeless - Not at all   PHQ-2 Score - 0       Abuse: Current or Past(Physical, Sexual or Emotional)- No  Do you feel safe in your environment? Yes    Have you ever done Advance Care Planning? (For example, a Health Directive, POLST, or a discussion with a medical provider or your loved ones about your wishes): No, advance care planning information given to patient to review.  Patient declined advance care planning discussion at this time.    Social History     Tobacco Use     Smoking status: Never Smoker     Smokeless  tobacco: Never Used   Substance Use Topics     Alcohol use: Yes     Comment: 1/week     If you drink alcohol do you typically have >3 drinks per day or >7 drinks per week? Yes - AUDIT SCORE:     No flowsheet data found.                      Last PSA: No results found for: PSA    Reviewed orders with patient. Reviewed health maintenance and updated orders accordingly - Yes  Lab work is in process  Labs reviewed in EPIC    Reviewed and updated as needed this visit by clinical staff  Tobacco  Allergies  Meds              Reviewed and updated as needed this visit by Provider                    ROS:  CONSTITUTIONAL: NEGATIVE for fever, chills, change in weight  INTEGUMENTARY/SKIN: NEGATIVE for worrisome rashes, moles or lesions  EYES: NEGATIVE for vision changes or irritation  ENT: NEGATIVE for ear, mouth and throat problems  RESP: NEGATIVE for significant cough or SOB  CV: NEGATIVE for chest pain, palpitations or peripheral edema  GI: NEGATIVE for nausea, abdominal pain, heartburn, or change in bowel habits   male: negative for dysuria, hematuria, decreased urinary stream, erectile dysfunction, urethral discharge  MUSCULOSKELETAL: NEGATIVE for significant arthralgias or myalgia  NEURO: NEGATIVE for weakness, dizziness or paresthesias  PSYCHIATRIC: NEGATIVE for changes in mood or affect    OBJECTIVE:   /70   Pulse 56   Temp 98.1  F (36.7  C) (Temporal)   Wt 68 kg (150 lb)   SpO2 98%   BMI 24.58 kg/m    EXAM:  GENERAL: healthy, alert and no distress  EYES: Eyes grossly normal to inspection, PERRL and conjunctivae and sclerae normal  HENT: ear canals and TM's normal, nose and mouth without ulcers or lesions  NECK: no adenopathy, no asymmetry, masses, or scars and thyroid normal to palpation  RESP: lungs clear to auscultation - no rales, rhonchi or wheezes  CV: regular rate and rhythm, normal S1 S2, no S3 or S4, no murmur, click or rub, no peripheral edema and peripheral pulses strong  ABDOMEN: soft,  "nontender, no hepatosplenomegaly, no masses and bowel sounds normal   (male): normal male genitalia without lesions or urethral discharge, no hernia  MS: right knee ligaments stable, no effusion, slightly tender patellar compression. no gross musculoskeletal defects noted, no edema  SKIN: no suspicious lesions or rashes  NEURO: Normal strength and tone, mentation intact and speech normal  PSYCH: mentation appears normal, affect normal/bright    Diagnostic Test Results:  Labs reviewed in Epic    ASSESSMENT/PLAN:       ICD-10-CM    1. Routine general medical examination at a health care facility  Z00.00    2. Acute pain of left knee  M25.562 [facility]       Patient has been advised of split billing requirements and indicates understanding: Yes  COUNSELING:  Reviewed preventive health counseling, as reflected in patient instructions       Regular exercise       Healthy diet/nutrition       Safe sex practices/STD prevention declines STI lab screen    Estimated body mass index is 24.58 kg/m  as calculated from the following:    Height as of 7/10/20: 1.664 m (5' 5.5\").    Weight as of this encounter: 68 kg (150 lb).        He reports that he has never smoked. He has never used smokeless tobacco.      Carlos Dai MD  [facility]  "

## 2021-09-12 ENCOUNTER — HEALTH MAINTENANCE LETTER (OUTPATIENT)
Age: 26
End: 2021-09-12

## 2021-11-14 ENCOUNTER — MYC MEDICAL ADVICE (OUTPATIENT)
Dept: FAMILY MEDICINE | Facility: CLINIC | Age: 26
End: 2021-11-14
Payer: COMMERCIAL

## 2021-11-16 NOTE — TELEPHONE ENCOUNTER
First dose info:     Lot #: [id number]     Location: [place] [address] [place] [address]     Second dose info:  Lot #: [id number]     Location:   [id number]  [address] [place] [address]

## 2021-12-17 ENCOUNTER — TELEPHONE (OUTPATIENT)
Dept: FAMILY MEDICINE | Facility: CLINIC | Age: 26
End: 2021-12-17
Payer: COMMERCIAL

## 2021-12-17 ENCOUNTER — MYC MEDICAL ADVICE (OUTPATIENT)
Dept: FAMILY MEDICINE | Facility: CLINIC | Age: 26
End: 2021-12-17
Payer: COMMERCIAL

## 2021-12-17 DIAGNOSIS — R94.31 ABNORMAL RESTING ECG FINDINGS: Primary | ICD-10-CM

## 2021-12-17 NOTE — TELEPHONE ENCOUNTER
Reason for Call: Request for an order or referral:    Order or referral being requested: Echocardiogram Referral    Date needed: as soon as possible    Has the patient been seen by the PCP for this problem? NO    Additional comments: Pt stated he needs follow up for an abnormal EKG result regarding health screens check list for employer.    Pt would like a copy sent via my chart    Phone number Patient can be reached at:  Home number on file [phone] (home)    Best Time:  Anytime     Can we leave a detailed message on this number?  YES    Call taken on 12/17/2021 at 4:00 PM by Ines Bernal MA

## 2021-12-17 NOTE — TELEPHONE ENCOUNTER
Dr. Dai,    Ok for echo order? Please see below. Cued if appropriate.     Thanks,  PENNY Whalen  [facility]

## 2021-12-18 NOTE — TELEPHONE ENCOUNTER
Yes, order signed, please notify patient- ask him to call [phone] to schedule echo, thanks Carlos

## 2021-12-20 NOTE — TELEPHONE ENCOUNTER
Spoke with pt, notified, pt scheduled and verbalized understanding.       Thanks,  PENNY Whalen  [facility]

## 2021-12-27 ENCOUNTER — HOSPITAL ENCOUNTER (OUTPATIENT)
Dept: CARDIOLOGY | Facility: CLINIC | Age: 26
Discharge: HOME | End: 2021-12-27
Admitting: FAMILY MEDICINE

## 2021-12-27 DIAGNOSIS — Z02.1 PRE-EMPLOYMENT HEALTH SCREENING EXAMINATION: ICD-10-CM

## 2021-12-27 LAB — LVEF ECHO: NORMAL

## 2021-12-27 PROCEDURE — 93306 TTE W/DOPPLER COMPLETE: CPT | Mod: 26 | Performed by: INTERNAL MEDICINE

## 2021-12-27 PROCEDURE — 93306 TTE W/DOPPLER COMPLETE: CPT

## 2022-02-20 ENCOUNTER — E-VISIT (OUTPATIENT)
Dept: URGENT CARE | Facility: CLINIC | Age: 27
End: 2022-02-20
Payer: COMMERCIAL

## 2022-02-20 ENCOUNTER — MYC MEDICAL ADVICE (OUTPATIENT)
Dept: FAMILY MEDICINE | Facility: CLINIC | Age: 27
End: 2022-02-20

## 2022-02-20 DIAGNOSIS — L30.9 ECZEMA, UNSPECIFIED TYPE: ICD-10-CM

## 2022-02-20 DIAGNOSIS — L30.9 ECZEMA, UNSPECIFIED TYPE: Primary | ICD-10-CM

## 2022-02-20 PROCEDURE — 99207 PR NO CHARGE LOS: CPT | Performed by: PHYSICIAN ASSISTANT

## 2022-02-20 RX ORDER — FLUOCINOLONE ACETONIDE 0.1 MG/G
CREAM TOPICAL 2 TIMES DAILY
Qty: 60 G | Refills: 0 | Status: SHIPPED | OUTPATIENT
Start: 2022-02-20 | End: 2022-02-23

## 2022-02-21 NOTE — TELEPHONE ENCOUNTER
Mychart message to pt    E-Prescribing Status: Receipt confirmed by pharmacy (2/20/2022  2:00 PM CST) for fluocinolone (SYNALAR) 0.01 % external cream to [facility], Orestes Briscoe RN   [facility]

## 2022-02-23 RX ORDER — FLUOCINOLONE ACETONIDE 0.1 MG/G
CREAM TOPICAL 2 TIMES DAILY PRN
Qty: 30 G | Refills: 0 | Status: SHIPPED | OUTPATIENT
Start: 2022-02-23

## 2022-02-23 RX ORDER — FLUOCINOLONE ACETONIDE 0.1 MG/G
CREAM TOPICAL 2 TIMES DAILY
Qty: 30 G | Refills: 0 | Status: SHIPPED | OUTPATIENT
Start: 2022-02-23 | End: 2022-02-23

## 2022-02-23 RX ORDER — CX-024414 0.2 MG/ML
INJECTION, SUSPENSION INTRAMUSCULAR
COMMUNITY
Start: 2021-05-22

## 2022-02-23 NOTE — TELEPHONE ENCOUNTER
Patient calling to report that pharmacy is out of stock of fluocinolone (SYNALAR) 0.01 % external cream. They do have a half strength cream on hand . Patient asks for a prescription for the half strength cream as he is leaving to [facility] tomorrow for 10 months.

## 2022-06-19 ENCOUNTER — HEALTH MAINTENANCE LETTER (OUTPATIENT)
Age: 27
End: 2022-06-19

## 2022-08-03 NOTE — TELEPHONE ENCOUNTER
Mr. Li called regarding some new swelling at left incision site. Underwent bilateral inguinal hernia repair with Dr. Mike and has been doing well at home since.    He reports that he rolled over and got up earlier today, following which he had some pain at the site of the left sided incision. Mildly tender to touch and with movement, but not at rest. Also some diffuse swelling compared to the right side, stable. Swelling is slightly firm but mostly diffuse without palpable mass. No redness or drainage. He has been feeling well, denies fevers, chills, nausea or vomiting. Eating and drinking normally, voiding without difficulty. He reports having post op follow up scheduled with Dr. Mike.     -Discussed monitoring the area for any worsening of swelling or pain, as well as signs concerning for infection like fevers, chills, redness or drainage. Reminded him that he can always come to the ED for evaluation if he has worsening or new symptoms. He was in agreement with this plan.     Deya Warner MD  General Surgery, PGY2    Complex Repair And Double M Plasty Text: The defect edges were debeveled with a #15 scalpel blade.  The primary defect was closed partially with a complex linear closure.  Given the location of the remaining defect, shape of the defect and the proximity to free margins a double M plasty was deemed most appropriate for complete closure of the defect.  Using a sterile surgical marker, an appropriate advancement flap was drawn incorporating the defect and placing the expected incisions within the relaxed skin tension lines where possible.    The area thus outlined was incised deep to adipose tissue with a #15 scalpel blade.  The skin margins were undermined to an appropriate distance in all directions utilizing iris scissors.

## 2022-11-19 ENCOUNTER — HEALTH MAINTENANCE LETTER (OUTPATIENT)
Age: 27
End: 2022-11-19

## 2023-07-01 ENCOUNTER — HEALTH MAINTENANCE LETTER (OUTPATIENT)
Age: 28
End: 2023-07-01

## 2024-08-24 ENCOUNTER — HEALTH MAINTENANCE LETTER (OUTPATIENT)
Age: 29
End: 2024-08-24

## 2024-09-04 ENCOUNTER — APPOINTMENT (OUTPATIENT)
Dept: OPTOMETRY | Facility: CLINIC | Age: 29
End: 2024-09-04
Payer: COMMERCIAL

## 2024-09-04 PROCEDURE — 92340 FIT SPECTACLES MONOFOCAL: CPT | Performed by: OPTOMETRIST

## (undated) DEVICE — NDL INSUFFLATION 13GA 120MM C2201

## (undated) DEVICE — SU VICRYL 0 CT-2 27" J334H

## (undated) DEVICE — ESU GROUND PAD ADULT REM W/15' CORD E7507DB

## (undated) DEVICE — DAVINCI XI MONOPOLAR SCISSORS HOT SHEARS 8MM 470179

## (undated) DEVICE — DAVINCI XI NDL DRIVER LARGE 470006

## (undated) DEVICE — SUPPORTER ATHLETIC LG LATEX 202636

## (undated) DEVICE — TUBING INSUFFLATION W/FILTER CPC TO LUER 620-030-301

## (undated) DEVICE — DAVINCI XI DRAPE ARM 470015

## (undated) DEVICE — DAVINCI XI SEAL UNIVERSAL 5-8MM 470361

## (undated) DEVICE — Device

## (undated) DEVICE — LINEN TOWEL PACK X6 WHITE 5487

## (undated) DEVICE — DRAPE SHEET REV FOLD 3/4 9349

## (undated) DEVICE — GLOVE PROTEXIS POWDER FREE SMT 7.5  2D72PT75X

## (undated) DEVICE — DAVINCI HOT SHEARS TIP COVER  400180

## (undated) DEVICE — DAVINCI XI DRAPE COLUMN 470341

## (undated) DEVICE — PACK GOWN 3/PK DISP XL SBA32GPFCB

## (undated) DEVICE — LINEN TOWEL PACK X30 5481

## (undated) DEVICE — DRAPE MAYO STAND 23X54 8337

## (undated) DEVICE — DRSG STERI STRIP 1/2X4" R1547

## (undated) DEVICE — SU STRATAFIX PDS PLUS 3-0 SPIRAL SH 15CM SXPP1B420

## (undated) DEVICE — SYSTEM CLEARIFY VISUALIZATION 21-345

## (undated) DEVICE — SOL WATER IRRIG 1000ML BOTTLE 2F7114

## (undated) DEVICE — LINEN GOWN XLG 5407

## (undated) DEVICE — DAVINCI XI GRASPER ENDOWRIST PROGRASP 470093

## (undated) DEVICE — SU MONOCRYL 4-0 PS-2 27" UND Y426H

## (undated) DEVICE — PREP CHLORAPREP 26ML TINTED ORANGE  260815

## (undated) DEVICE — SOL NACL 0.9% IRRIG 1000ML BOTTLE 2F7124

## (undated) RX ORDER — ACETAMINOPHEN 500 MG
TABLET ORAL
Status: DISPENSED
Start: 2020-07-10

## (undated) RX ORDER — FENTANYL CITRATE 50 UG/ML
INJECTION, SOLUTION INTRAMUSCULAR; INTRAVENOUS
Status: DISPENSED
Start: 2020-07-10

## (undated) RX ORDER — GABAPENTIN 300 MG/1
CAPSULE ORAL
Status: DISPENSED
Start: 2020-07-10

## (undated) RX ORDER — HYDROMORPHONE HYDROCHLORIDE 1 MG/ML
INJECTION, SOLUTION INTRAMUSCULAR; INTRAVENOUS; SUBCUTANEOUS
Status: DISPENSED
Start: 2020-07-10

## (undated) RX ORDER — HYDROCODONE BITARTRATE AND ACETAMINOPHEN 5; 325 MG/1; MG/1
TABLET ORAL
Status: DISPENSED
Start: 2020-07-10

## (undated) RX ORDER — CELECOXIB 200 MG/1
CAPSULE ORAL
Status: DISPENSED
Start: 2020-07-10

## (undated) RX ORDER — CEFAZOLIN SODIUM 2 G/100ML
INJECTION, SOLUTION INTRAVENOUS
Status: DISPENSED
Start: 2020-07-10

## (undated) RX ORDER — BUPIVACAINE HYDROCHLORIDE 5 MG/ML
INJECTION, SOLUTION EPIDURAL; INTRACAUDAL
Status: DISPENSED
Start: 2020-07-10